# Patient Record
Sex: FEMALE | Race: BLACK OR AFRICAN AMERICAN | Employment: FULL TIME
[De-identification: names, ages, dates, MRNs, and addresses within clinical notes are randomized per-mention and may not be internally consistent; named-entity substitution may affect disease eponyms.]

---

## 2019-04-19 ENCOUNTER — HEALTH MAINTENANCE LETTER (OUTPATIENT)
Age: 24
End: 2019-04-19

## 2019-05-21 ENCOUNTER — MEDICAL CORRESPONDENCE (OUTPATIENT)
Dept: HEALTH INFORMATION MANAGEMENT | Facility: CLINIC | Age: 24
End: 2019-05-21

## 2019-06-20 ENCOUNTER — MEDICAL CORRESPONDENCE (OUTPATIENT)
Dept: HEALTH INFORMATION MANAGEMENT | Facility: CLINIC | Age: 24
End: 2019-06-20

## 2019-07-24 ENCOUNTER — PRE VISIT (OUTPATIENT)
Dept: PLASTIC SURGERY | Facility: CLINIC | Age: 24
End: 2019-07-24

## 2019-07-24 NOTE — TELEPHONE ENCOUNTER
FUTURE VISIT INFORMATION      FUTURE VISIT INFORMATION:    Date: 9/17/19    Time: 2:00pm    Location: [facility]  REFERRAL INFORMATION:    Referring provider:  Self    Referring providers clinic:  N/A    Reason for visit/diagnosis  Top consult F[facility]    RECORDS REQUESTED FROM:       No records to collect

## 2019-09-17 ENCOUNTER — PATIENT OUTREACH (OUTPATIENT)
Dept: PLASTIC SURGERY | Facility: CLINIC | Age: 24
End: 2019-09-17

## 2019-09-17 ENCOUNTER — OFFICE VISIT (OUTPATIENT)
Dept: PLASTIC SURGERY | Facility: CLINIC | Age: 24
End: 2019-09-17
Payer: COMMERCIAL

## 2019-09-17 VITALS
OXYGEN SATURATION: 100 % | WEIGHT: 236.2 LBS | HEART RATE: 71 BPM | BODY MASS INDEX: 39.35 KG/M2 | HEIGHT: 65 IN | SYSTOLIC BLOOD PRESSURE: 121 MMHG | DIASTOLIC BLOOD PRESSURE: 69 MMHG

## 2019-09-17 DIAGNOSIS — F64.0 GENDER DYSPHORIA IN ADULT: Primary | ICD-10-CM

## 2019-09-17 DIAGNOSIS — Z12.31 VISIT FOR SCREENING MAMMOGRAM: ICD-10-CM

## 2019-09-17 RX ORDER — CONTAINER,EMPTY
EACH MISCELLANEOUS
COMMUNITY
Start: 2019-05-10

## 2019-09-17 RX ORDER — MULTIVITAMIN WITH FOLIC ACID 400 MCG
1 TABLET ORAL
COMMUNITY

## 2019-09-17 RX ORDER — TESTOSTERONE CYPIONATE 200 MG/ML
VIAL (ML) INTRAMUSCULAR
COMMUNITY
Start: 2019-05-10 | End: 2020-09-01

## 2019-09-17 RX ORDER — TESTOSTERONE CYPIONATE 200 MG/ML
30 INJECTION, SOLUTION INTRAMUSCULAR
COMMUNITY
Start: 2019-07-16

## 2019-09-17 RX ORDER — SYRINGE W-NEEDLE,DISPOSAB,3 ML 25GX5/8"
SYRINGE, EMPTY DISPOSABLE MISCELLANEOUS
COMMUNITY
Start: 2019-05-10

## 2019-09-17 ASSESSMENT — PAIN SCALES - GENERAL: PAINLEVEL: NO PAIN (0)

## 2019-09-17 ASSESSMENT — MIFFLIN-ST. JEOR: SCORE: 1827.28

## 2019-09-17 NOTE — PROGRESS NOTES
[facility]:  Care Coordination Note     SITUATION   Patient (Roland, They/Them) is a 24 year old who is receiving support for:  Clinic Care Coordination - Initial (Top consultation - Alma Rosa)  .    BACKGROUND     Pt attended \[facility] [place] [facility]\"" consultation with Dr. St, pt was unaccompanied.     ASSESSMENT     Surgery              CGC Assessment  [facility] Care ([facility]) Enrollment: Enrolled  Patient has a therapist: Yes  Letter of support #1: Requested  Surgery being considered: Yes  Mastectomy: Yes          PLAN          Nursing Interventions:   [facility] program and services discussed with patient. Educational surgical packet provided and reviewed with patient. Process for accessing surgery discussed, including: WPATH standards of care, letters of support, treatment plan action steps, PA insurance process, surgery scheduling, and approximate timeline.     Photography consent signed by patient. Surgeon s photography outcomes reviewed with patient. Pt questions answered within scope of practice.     Follow-up plan:  Pt could not remember therapist last name, so ALBERTO was given to pt to return when they obtain their letter of support. Per Dr. St's note, pt needs to obtain mammogram. Pt is going to contemplate weight loss.        Mauro Adams

## 2019-09-17 NOTE — NURSING NOTE
"Chief Complaint   Patient presents with     Consult     new pt here for mastectomy consult       Vitals:    09/17/19 1354   BP: 121/69   BP Location: Left arm   Patient Position: Chair   Cuff Size: Adult Large   Pulse: 71   SpO2: 100%   Weight: 107.1 kg (236 lb 3.2 oz)   Height: 1.651 m (5' 5\")       Body mass index is 39.31 kg/m .    Rommel Milligan EMT    "

## 2019-09-17 NOTE — PROGRESS NOTES
"CC: Gender dysphoria, requesting top surgery.    HPI: Patient is a 24-year-old transmasculine individual who prefers they them pronouns.  They are interested in undergoing top surgery.  They have been seriously considering this and has been researching the topic for the past year.  They have history of binding their chest daily for the past 3 months.  By undergoing top surgery, they would like to better align their physical body with her chosen gender identity.  They transitioned to 4 months ago.  Has been on hormone testosterone injection therapy for the past 4 months.  They deny any previous breast history.    MEDS:   Prior to Admission medications    Medication Sig Start Date End Date Taking? Authorizing Provider   Multiple Vitamin (TAB-A-JASKARAN) TABS Take 1 tablet by mouth   Yes Reported, Patient   Needle, Disp, 18G X 1-1/2\" MISC Use for weekly administration of medication 5/10/19  Yes Reported, Patient   Sharps Container MISC For sharps 5/10/19  Yes Reported, Patient   Syringe 25G X 1\" 3 ML MISC Use for weekly administration of medication 5/10/19  Yes Reported, Patient   syringe, disposable, 1 ML MISC Use for weekly administration of medication 5/10/19  Yes Reported, Patient   testosterone cypionate (DEPOTESTOSTERONE) 200 MG/ML injection Inject 30 mg into the muscle 7/16/19  Yes Reported, Patient   Testosterone Cypionate 200 MG/ML SOLN  5/10/19  Yes Reported, Patient       ALLERGIES:   Allergies   Allergen Reactions     Cats Cough, Difficulty breathing, Hives, Itching and Shortness Of Breath     No Clinical Screening - See Comments      Seasonal, Strawberries, Cats      Strawberry Hives, Cough and Itching       PMH: None.    PSH: None.    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    FH: None.    ROS: Denies chest pain, shortness of breath, diabetes, MI, CVA, DVT, " "PE, and bleeding disorders.    PHYSICAL EXAMINATION: /69 (BP Location: Left arm, Patient Position: Chair, Cuff Size: Adult Large)   Pulse 71   Ht 1.651 m (5' 5\")   Wt 107.1 kg (236 lb 3.2 oz)   SpO2 100%   BMI 39.31 kg/m    General: No acute distress.  Chest examination was performed in the presence of chaperone.  This revealed bilateral grade 3 ptosis.  Breasts are pendulous.  Pectoralis muscles intact bilaterally.  There is asymmetry with underlying skeletal asymmetry's there quite frankly visible.  The left chest is wider than the right.  There is curvature of the spine.  Notch to nipple distance is 30 cm on the right and 31 cm on the left.  Areole or diameter is 6 cm bilaterally.  Nipple to fold distance is 12 cm on the right and 13 cm on the left.  Base diameter is 14 cm on the right and 50 cm on the left.  There are moderate lateral thoracic rolls bilaterally.    ASSESSMENT: Gender dysphoria, requesting top surgery.    PLAN: I explained the need for letter of support from a mental health professional.  I am also requesting a baseline screening mammogram given the level of ptosis.  I recommended weight loss prior to top surgery to maximize the aesthetic outcome.  Patient would like to consider this.  There is a chance that they would like surgery without weight loss.  With these items obtained, patient is a potential candidate for bilateral simple complete mastectomy with free nipple graft reconstruction as a form of top surgery to masculinize the chest.  I explained this outpatient procedure in detail today.  I explained the risks to include bleeding, infection, injury to surrounding structures, fluid collection, nipple or nipple graft loss, nipple sensory loss, change in nipple size, wound healing difficulties, contour deformity, dog ears, asymmetry, and need for revision surgery.  Patient accepts these risks and wishes to proceed with surgery.  We will wait for a letter of support and the " patient's decision on weight loss.    Total time spent with patient was 30 min of which greater than 50% was in counseling.

## 2019-09-17 NOTE — LETTER
"9/17/2019       RE: Roland Frances  [address]  [place] [address]     Dear Colleague,    Thank you for referring your patient, Roland Frances, to the [facility] PLASTIC AND RECONSTRUCTIVE SURGERY at [facility]. Please see a copy of my visit note below.    CC: Gender dysphoria, requesting top surgery.    HPI: Patient is a 24-year-old transmasculine individual who prefers they them pronouns.  They are interested in undergoing top surgery.  They have been seriously considering this and has been researching the topic for the past year.  They have history of binding their chest daily for the past 3 months.  By undergoing top surgery, they would like to better align their physical body with her chosen gender identity.  They transitioned to 4 months ago.  Has been on hormone testosterone injection therapy for the past 4 months.  They deny any previous breast history.    MEDS:   Prior to Admission medications    Medication Sig Start Date End Date Taking? Authorizing Provider   Multiple Vitamin (TAB-A-JASKARAN) TABS Take 1 tablet by mouth   Yes Reported, Patient   Needle, Disp, 18G X 1-1/2\" MISC Use for weekly administration of medication 5/10/19  Yes Reported, Patient   Sharps Container MISC For sharps 5/10/19  Yes Reported, Patient   Syringe 25G X 1\" 3 ML MISC Use for weekly administration of medication 5/10/19  Yes Reported, Patient   syringe, disposable, 1 ML MISC Use for weekly administration of medication 5/10/19  Yes Reported, Patient   testosterone cypionate (DEPOTESTOSTERONE) 200 MG/ML injection Inject 30 mg into the muscle 7/16/19  Yes Reported, Patient   Testosterone Cypionate 200 MG/ML SOLN  5/10/19  Yes Reported, Patient       ALLERGIES:   Allergies   Allergen Reactions     Cats Cough, Difficulty breathing, Hives, Itching and Shortness Of Breath     No Clinical Screening - See Comments      Seasonal, Strawberries, Cats      Strawberry Hives, Cough and Itching " "      PMH: None.    PSH: None.    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    FH: None.    ROS: Denies chest pain, shortness of breath, diabetes, MI, CVA, DVT, PE, and bleeding disorders.    PHYSICAL EXAMINATION: /69 (BP Location: Left arm, Patient Position: Chair, Cuff Size: Adult Large)   Pulse 71   Ht 1.651 m (5' 5\")   Wt 107.1 kg (236 lb 3.2 oz)   SpO2 100%   BMI 39.31 kg/m     General: No acute distress.  Chest examination was performed in the presence of chaperone.  This revealed bilateral grade 3 ptosis.  Breasts are pendulous.  Pectoralis muscles intact bilaterally.  There is asymmetry with underlying skeletal asymmetry's there quite frankly visible.  The left chest is wider than the right.  There is curvature of the spine.  Notch to nipple distance is 30 cm on the right and 31 cm on the left.  Areole or diameter is 6 cm bilaterally.  Nipple to fold distance is 12 cm on the right and 13 cm on the left.  Base diameter is 14 cm on the right and 50 cm on the left.  There are moderate lateral thoracic rolls bilaterally.    ASSESSMENT: Gender dysphoria, requesting top surgery.    PLAN: I explained the need for letter of support from a mental health professional.  I am also requesting a baseline screening mammogram given the level of ptosis.  I recommended weight loss prior to top surgery to maximize the aesthetic outcome.  Patient would like to consider this.  There is a chance that they would like surgery without weight loss.  With these items obtained, patient is a potential candidate for bilateral simple complete mastectomy with free nipple graft reconstruction as a form of top surgery to masculinize the chest.  I explained this outpatient procedure in detail today.  I explained the risks to include bleeding, infection, injury to surrounding " structures, fluid collection, nipple or nipple graft loss, nipple sensory loss, change in nipple size, wound healing difficulties, contour deformity, dog ears, asymmetry, and need for revision surgery.  Patient accepts these risks and wishes to proceed with surgery.  We will wait for a letter of support and the patient's decision on weight loss.    Total time spent with patient was 30 min of which greater than 50% was in counseling.    Again, thank you for allowing me to participate in the care of your patient.      Sincerely,    Jm St MD

## 2019-10-11 ENCOUNTER — APPOINTMENT (OUTPATIENT)
Dept: FAMILY MEDICINE | Age: 24
End: 2019-10-11

## 2019-10-11 ENCOUNTER — PATIENT OUTREACH (OUTPATIENT)
Dept: PLASTIC SURGERY | Facility: CLINIC | Age: 24
End: 2019-10-11

## 2019-10-11 NOTE — PROGRESS NOTES
[facility]:  Care Coordination Note     SITUATION   Patient (Roland, They/Them)  is a 24 year old who is receiving support for:  Clinic Care Coordination - Follow-up (Letter of support received)  .    BACKGROUND     Pt submitted new letter of support, which is adequate for PA.    Pt needs to decide on weight loss & Complete mammogram.     ASSESSMENT     Surgery              CGC Assessment  Comprehensive Gender Care ([facility] Enrollment: Enrolled  Patient has a therapist: Yes  Name of therapist: Edith Mejía  Letter of support #1: Received  Letter #1 Date: 10/11/19  Surgery being considered: Yes  Mastectomy: Yes          PLAN          Nursing Interventions:   Reviewed letter of support for WPATH requirements, which is adequate.    Follow-up plan:  Wait to hear from pt on weight loss and mammogram completion.        Mauro Adams

## 2019-11-12 ENCOUNTER — TRANSFERRED RECORDS (OUTPATIENT)
Dept: HEALTH INFORMATION MANAGEMENT | Facility: CLINIC | Age: 24
End: 2019-11-12

## 2019-11-15 ENCOUNTER — TELEPHONE (OUTPATIENT)
Dept: PLASTIC SURGERY | Facility: CLINIC | Age: 24
End: 2019-11-15

## 2019-11-15 ENCOUNTER — PATIENT OUTREACH (OUTPATIENT)
Dept: PLASTIC SURGERY | Facility: CLINIC | Age: 24
End: 2019-11-15

## 2019-11-15 NOTE — PROGRESS NOTES
[facility]:  Care Coordination Note     SITUATION   Patient (Roland, Maye/Them)  is a 24 year old who is receiving support for:  Clinic Care Coordination - Follow-up (Received mammogram results)  .    BACKGROUND     Pt completed top consult with Dr. St on 9/17/19. Pt submitted mammogram results and letter of support.     Ready to PA.     ASSESSMENT     Surgery              CGC Assessment  Comprehensive Gender Care ([facility]) Enrollment: Enrolled  Patient has a therapist: Yes  Name of therapist: Edith Mejía  Letter of support #1: Received  Letter #1 Date: 10/11/19  Surgery being considered: Yes  Mastectomy: Yes  Mammogram completed: Yes(11/14/19 @ [facility] - negative)          PLAN          Nursing Interventions:   Reviewed letter of support for WPATH standards of care which is adequate.     Follow-up plan:  DIMITRI MartinezCC to review mammogram from [facility].  Ready to HARSH Adams

## 2019-12-02 ENCOUNTER — TELEPHONE (OUTPATIENT)
Dept: PLASTIC SURGERY | Facility: CLINIC | Age: 24
End: 2019-12-02

## 2019-12-02 NOTE — TELEPHONE ENCOUNTER
received [facility] denial #[id number] for  codes [id number] and [id number], as this is not a covered item under members plan benefits

## 2019-12-03 ENCOUNTER — PATIENT OUTREACH (OUTPATIENT)
Dept: PLASTIC SURGERY | Facility: CLINIC | Age: 24
End: 2019-12-03

## 2019-12-04 NOTE — PROGRESS NOTES
Mailed pt ALBERTO for Jorge [facility] Villa, to help appeal PA denial.     Mauro Adams, [facility]  Transgender Care Coordinator

## 2020-03-11 ENCOUNTER — HEALTH MAINTENANCE LETTER (OUTPATIENT)
Age: 25
End: 2020-03-11

## 2020-05-27 NOTE — TELEPHONE ENCOUNTER
FUTURE VISIT INFORMATION      FUTURE VISIT INFORMATION:    Date: 9/1/20    Time: 3:00pm    Location: [facility]  REFERRAL INFORMATION:    Referring provider:  self    Referring providers clinic:  N/A    Reason for visit/diagnosis  top consult    RECORDS REQUESTED FROM:       No recs to collect

## 2020-08-01 ENCOUNTER — PRE VISIT (OUTPATIENT)
Dept: SURGERY | Facility: CLINIC | Age: 25
End: 2020-08-01

## 2020-09-01 ENCOUNTER — PATIENT OUTREACH (OUTPATIENT)
Dept: PLASTIC SURGERY | Facility: CLINIC | Age: 25
End: 2020-09-01

## 2020-09-01 ENCOUNTER — OFFICE VISIT (OUTPATIENT)
Dept: PLASTIC SURGERY | Facility: CLINIC | Age: 25
End: 2020-09-01

## 2020-09-01 VITALS — WEIGHT: 230 LBS | HEIGHT: 65 IN | BODY MASS INDEX: 38.32 KG/M2

## 2020-09-01 DIAGNOSIS — F64.0 GENDER DYSPHORIA IN ADULT: Primary | ICD-10-CM

## 2020-09-01 ASSESSMENT — MIFFLIN-ST. JEOR: SCORE: 1794.15

## 2020-09-01 ASSESSMENT — PAIN SCALES - GENERAL: PAINLEVEL: NO PAIN (0)

## 2020-09-01 NOTE — PROGRESS NOTES
[facility]:  Care Coordination Note     SITUATION   Patient (Roland, they/them) is a 25 year old who is receiving support for:  Clinic Care Coordination - Follow-up  .    BACKGROUND     Ready to PA for top surgery with Dr. Rodriguez.    Pt had denial of top surgery with Dr. St from [facility]. It appears pt has new insurance.     ASSESSMENT     Surgery              CGC Assessment  Comprehensive Gender Care (CGC) Enrollment: Enrolled  Patient has a therapist: Yes  Name of therapist: Edith Chin  Letter of support #1: Received(no date on letter, uploaded on 10/11/2019)  Surgery being considered: Yes  Mastectomy: Yes  Mammogram completed: Yes(11/2019, per Dr. Rodriguez no additional imaging needed.)          PLAN          Nursing Interventions:   Reviewed letter of support for WPATH standards of care which is adequate.     Follow-up plan:  Ready to PA for top surgery.        Mauro Adams

## 2020-09-01 NOTE — NURSING NOTE
"Chief Complaint   Patient presents with     Consult     Pt here for top consult       Vitals:    09/01/20 1448   Weight: 104.3 kg (230 lb)   Height: 1.651 m (5' 5\")       Body mass index is 38.27 kg/m .      YONAS QuigleyT                    No vitals taken per provider  "

## 2020-09-01 NOTE — LETTER
9/1/2020       RE: Roland Frances  [address]  [place] [address]     Dear Colleague,    Thank you for referring your patient, Roland Frances, to the [facility] PLASTIC AND RECONSTRUCTIVE SURGERY at [facility] Please see a copy of my visit note below.    PLASTICS NEW TOP   HPI: This is a 25 year old biological female who identifies as trans-masculine with a history of gender dysphoria who presents today by themself for a consultation for top surgery. Their pronouns are they/them and their preferred name is Roland. They were referred by the trans community and found me online. They made their appointment 3 months ago. Their previous therapist was Edith Gould at [facility] but they no longer see her because they do not accept their insurance anymore. She wrote a letter of support for Roland about a year ago. Roland will contact Mauro to find out if they need a new letter of support. They have been on testosterone for 15 months, administered by Dr. Barrientos at [facility]. They have been living their chosen gender identity/role for about a year and a half. They bind with GC2B. No breast lumps, skin puckering, nipple drainage, or other breast problems. They had a mammogram in November of 2019 when they were expecting to have top surgery done with Dr. St. They saw. Dr. St last year, who suggested they lose some weight before surgery. Roland did not feel like he was the right match.     Medical Hx: Gender dysphoria. No history of asthma, diabetes mellitus, or GERD. No bleeding, clotting, healing or scarring problems. Mild obesity.   Nasal polyp.    Surgical Hx: Nasal polyp extraction- June 2020    Family Hx: No family history of ovarian cancer.  Maternal aunt had breast and colon cancer.  Maternal grandfather had diabetes.   Mother has hypertension.    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  "Relationship status/family: No siblings. Lives with partner, Huy, and a dog. His partner is a student. They are able to take care of Jabri post-op. Smoking status: Non-smoker. Alcohol use: Very rarely. Diet: Cooks a lot, good balance of meat and veggies. Caffeine: 1 cup of coffee in the morning. No soda. Exercise: Goes for walks. Work out videos (boxing). Sleep: Gets about 8 hours Qnight.     PE: General: Height: 5' 5\" Weight: 230 lbs 0 oz (stated)  Chest: Nice upper chest contour, mild manubrial rounding. Very well developed trapezius muscles. Mild acne. Grade 3 nipple ptosis. Bilateral striae on breasts. Left breast is slightly lower, but right breast is slightly more full. Both breasts are at least 400-500g. Breasts situated fairly close together in midline. Minimal lateral thoracic fullness. Some lateral chest wall \"tapering\".  IMFs about 2-3cm low to pec origin. Left IMF about 0.5cm lower than the left. Mainly fibrofatty tissues on palpation.  Slight lumpiness in inferior poles of both breasts.  Minimal anterior axillary folds.  No lymphadenopathy or masses.   Photos taken with consent.     A&P: 25 year old biological female who identifies as trans-masculine who is a good candidate for gender affirming top surgery with one of the following: bilateral simple mastectomy vs. breast reduction, with nipple graft reconstruction. They will most likely need a bilateral simple mastectomy depending on intraoperative findings and the patient's desired outcome. They are interested in nipple grafts.The patient will not need a pre-op mammogram, as they have had already one in Nov. 2019. But they will require an H&P with their PCP.    They will be in contact with our Transgender Coordinator to discuss communications with staff and timeline. Any further discussion of risks and complications will be reviewed during the pre-op visit.     We will initiate the prior authorization process once we determine if insurance will require " a new letter of support.    According to [place] Case Law and [facility] standards of care, with an appropriate letter of support from a mental health provider, top surgery/mastectomy is medically necessary for the treatment of gender dysphoria.     Total time = 20-30 minutes, over half of which spent discussing surgical options    This note was prepared on behalf of Nehal Rodriguez MD by More Pagan, a trained medical scribe, based on my observations and the provider's statements to me.       Again, thank you for allowing me to participate in the care of your patient.      Sincerely,    Nehal Rodriguez MD

## 2020-09-01 NOTE — LETTER
9/1/2020       RE: Roland Frances  [address]  [place] [address]     Dear Colleague,    Thank you for referring your patient, Roland Frances, to the [facility] PLASTIC AND RECONSTRUCTIVE SURGERY at [facility]. Please see a copy of my visit note below.    PLASTICS NEW TOP   HPI: This is a 25 year old biological female who identifies as trans-masculine with a history of gender dysphoria who presents today by themself for a consultation for top surgery. Their pronouns are they/them and their preferred name is Roland. They were referred by the trans community and found me online. They made their appointment 3 months ago. Their previous therapist was Edith Gould at [facility] but they no longer see her because they do not accept their insurance anymore. She wrote a letter of support for Roland about a year ago. Roland will contact Mauro to find out if they need a new letter of support. They have been on testosterone for 15 months, administered by Dr. Barrientos at [facility]. They have been living their chosen gender identity/role for about a year and a half. They bind with GC2B. No breast lumps, skin puckering, nipple drainage, or other breast problems. They had a mammogram in November of 2019 when they were expecting to have top surgery done with Dr. St. They saw. Dr. St last year, who suggested they lose some weight before surgery. Roland did not feel like he was the right match.     Medical Hx: Gender dysphoria. No history of asthma, diabetes mellitus, or GERD. No bleeding, clotting, healing or scarring problems. Mild obesity.   Nasal polyp.    Surgical Hx: Nasal polyp extraction- June 2020    Family Hx: No family history of ovarian cancer.  Maternal aunt had breast and colon cancer.  Maternal grandfather had diabetes.   Mother has hypertension.          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  "Relationship status/family: No siblings. Lives with partner, Huy, and a dog. His partner is a student. They are able to take care of Jabri post-op. Smoking status: Non-smoker. Alcohol use: Very rarely. Diet: Cooks a lot, good balance of meat and veggies. Caffeine: 1 cup of coffee in the morning. No soda. Exercise: Goes for walks. Work out videos (boxing). Sleep: Gets about 8 hours Qnight.     PE: General: Height: 5' 5\" Weight: 230 lbs 0 oz (stated)  Chest: Nice upper chest contour, mild manubrial rounding. Very well developed trapezius muscles. Mild acne. Grade 3 nipple ptosis. Bilateral striae on breasts. Left breast is slightly lower, but right breast is slightly more full. Both breasts are at least 400-500g. Breasts situated fairly close together in midline. Minimal lateral thoracic fullness. Some lateral chest wall \"tapering\".  IMFs about 2-3cm low to pec origin. Left IMF about 0.5cm lower than the left. Mainly fibrofatty tissues on palpation.  Slight lumpiness in inferior poles of both breasts.  Minimal anterior axillary folds.  No lymphadenopathy or masses.   Photos taken with consent.     A&P: 25 year old biological female who identifies as trans-masculine who is a good candidate for gender affirming top surgery with one of the following: bilateral simple mastectomy vs. breast reduction, with nipple graft reconstruction. They will most likely need a bilateral simple mastectomy depending on intraoperative findings and the patient's desired outcome. They are interested in nipple grafts.The patient will not need a pre-op mammogram, as they have had already one in Nov. 2019. But they will require an H&P with their PCP.    They will be in contact with our Transgender Coordinator to discuss communications with staff and timeline. Any further discussion of risks and complications will be reviewed during the pre-op visit.     We will initiate the prior authorization process once we determine if insurance will require " a new letter of support.    According to [place] Case Law and [facility] standards of care, with an appropriate letter of support from a mental health provider, top surgery/mastectomy is medically necessary for the treatment of gender dysphoria.     Total time = 20-30 minutes, over half of which spent discussing surgical options    This note was prepared on behalf of Nehal Rodriguez MD by More Pagan, a trained medical scribe, based on my observations and the provider's statements to me.     Again, thank you for allowing me to participate in the care of your patient.  Sincerely,    Nehal Rodriguez MD

## 2020-09-08 ENCOUNTER — PATIENT OUTREACH (OUTPATIENT)
Dept: PLASTIC SURGERY | Facility: CLINIC | Age: 25
End: 2020-09-08

## 2020-10-15 ENCOUNTER — TELEPHONE (OUTPATIENT)
Dept: PLASTIC SURGERY | Facility: CLINIC | Age: 25
End: 2020-10-15

## 2020-10-16 ENCOUNTER — TELEPHONE (OUTPATIENT)
Dept: PLASTIC SURGERY | Facility: CLINIC | Age: 25
End: 2020-10-16

## 2020-10-16 NOTE — TELEPHONE ENCOUNTER
received VM from nurse with patient's insurance-she is working on an auth for same procedure for bilataral mastectomy for a a CA -i withdrew my request

## 2020-10-26 ENCOUNTER — TELEPHONE (OUTPATIENT)
Dept: PLASTIC SURGERY | Facility: CLINIC | Age: 25
End: 2020-10-26

## 2020-10-26 NOTE — TELEPHONE ENCOUNTER
tried to request another PA with insurance, found out that dr Rodriguez is out of network with patients plan,facility is in network

## 2020-10-28 ENCOUNTER — TELEPHONE (OUTPATIENT)
Dept: PLASTIC SURGERY | Facility: CLINIC | Age: 25
End: 2020-10-28

## 2020-10-29 ENCOUNTER — TELEPHONE (OUTPATIENT)
Dept: PLASTIC SURGERY | Facility: CLINIC | Age: 25
End: 2020-10-29

## 2020-10-30 ENCOUNTER — TELEPHONE (OUTPATIENT)
Dept: SURGERY | Facility: CLINIC | Age: 25
End: 2020-10-30

## 2020-11-04 ENCOUNTER — TELEPHONE (OUTPATIENT)
Dept: SURGERY | Facility: CLINIC | Age: 25
End: 2020-11-04

## 2020-11-23 ENCOUNTER — TELEPHONE (OUTPATIENT)
Dept: PLASTIC SURGERY | Facility: CLINIC | Age: 25
End: 2020-11-23

## 2020-11-23 NOTE — TELEPHONE ENCOUNTER
[facility] Call Center    Phone Message    May a detailed message be left on voicemail: yes     Reason for Call: Patient submitted an online appointment request to see Dr. St for Phalloplasty Consult.  Please contact patient with appointment options    Action Taken: Message routed to:  [facility] Surg    Travel Screening: Not Applicable

## 2020-11-24 DIAGNOSIS — F64.0 GENDER DYSPHORIA IN ADOLESCENT AND ADULT: Primary | ICD-10-CM

## 2020-11-24 RX ORDER — CEFAZOLIN SODIUM 2 G/50ML
2 SOLUTION INTRAVENOUS
Status: CANCELLED | OUTPATIENT
Start: 2020-11-24

## 2020-11-24 RX ORDER — CEFAZOLIN SODIUM 1 G/50ML
1 INJECTION, SOLUTION INTRAVENOUS SEE ADMIN INSTRUCTIONS
Status: CANCELLED | OUTPATIENT
Start: 2020-11-24

## 2020-11-24 NOTE — TELEPHONE ENCOUNTER
Called pt discussed obtaining the 2 LOS needed for bottom surgery then we will schedule for consult.     Mauro Adams [facility]  Transgender Care Coordinator

## 2021-01-03 ENCOUNTER — HEALTH MAINTENANCE LETTER (OUTPATIENT)
Age: 26
End: 2021-01-03

## 2021-01-25 DIAGNOSIS — Z11.59 ENCOUNTER FOR SCREENING FOR OTHER VIRAL DISEASES: ICD-10-CM

## 2021-02-08 ENCOUNTER — TELEPHONE (OUTPATIENT)
Dept: PLASTIC SURGERY | Facility: CLINIC | Age: 26
End: 2021-02-08

## 2021-02-08 DIAGNOSIS — F64.0 GENDER DYSPHORIA IN ADOLESCENT AND ADULT: Primary | ICD-10-CM

## 2021-02-08 NOTE — TELEPHONE ENCOUNTER
[facility] :  Care Coordination Note     SITUATION   Roland is a 26 year old adult who is receiving support for:  Care Team  .    BACKGROUND     Pt sent in 2 LOS for phalloplasty. Scheduled consult with Dr. St for 5/25/21.     ASSESSMENT     Surgery              [facility] Assessment  [facility] ([facility]) Enrollment: Enrolled  Patient has a therapist: Yes  Name of therapist: Edith Chin  Letter of support #1: Received  Letter of support #2: Received  Surgery being considered: Yes  Phalloplasty: Yes          PLAN          Nursing Interventions:  [facility] assessment completed    Follow-up plan:           Oksana Sweeney

## 2021-02-09 ENCOUNTER — PRE VISIT (OUTPATIENT)
Dept: PLASTIC SURGERY | Facility: CLINIC | Age: 26
End: 2021-02-09

## 2021-02-09 ENCOUNTER — OFFICE VISIT (OUTPATIENT)
Dept: PLASTIC SURGERY | Facility: CLINIC | Age: 26
End: 2021-02-09
Payer: COMMERCIAL

## 2021-02-09 VITALS
OXYGEN SATURATION: 99 % | WEIGHT: 220 LBS | SYSTOLIC BLOOD PRESSURE: 133 MMHG | BODY MASS INDEX: 36.65 KG/M2 | HEIGHT: 65 IN | HEART RATE: 49 BPM | DIASTOLIC BLOOD PRESSURE: 71 MMHG | TEMPERATURE: 98.3 F

## 2021-02-09 DIAGNOSIS — F64.0 GENDER DYSPHORIA IN ADOLESCENT AND ADULT: Primary | ICD-10-CM

## 2021-02-09 PROCEDURE — 99212 OFFICE O/P EST SF 10 MIN: CPT | Performed by: SURGERY

## 2021-02-09 ASSESSMENT — MIFFLIN-ST. JEOR: SCORE: 1743.79

## 2021-02-09 ASSESSMENT — PAIN SCALES - GENERAL: PAINLEVEL: NO PAIN (0)

## 2021-02-09 NOTE — PROGRESS NOTES
PLASTICS PRE-OP  This is a 26 year old year old biological female who identifies as trans-masculine who presents for their pre-op visit prior to bilateral simple mastectomy with nipple graft reconstruction scheduled for 2/17/21. They are here today by themselves. The patient has had a negative mammogram (Novermber 2019), and their letter of support from Migue Oakley at [facility] has been received. History and physical were received (1/26/21). COVID test is scheduled for Monday. Their partner Blanca will be taking care of them post-op.    My LPN, Tory , discussed periop instructions with the patient including: not eating anything 8 hours prior to surgery, drinking clear liquids up to 2 hours before surgery except for morning medications with a sip of water, the preop shower with surgical soap which was given, and wearing a button- or zip-up shirt on the day of surgery. She also instructed the patient to avoid NSAIDs x 1 week both before AND after surgery, but they may take Tylenol post-op for pain as needed. She also gave the patient a folder with information on eyal-op topics, including where the surgery will be.     I discussed the following with the patient; preop, intraop and postop phases of care on the day of surgery, the placement of a bladder catheter during surgery that will likely be removed in recovery, postop cares and limitations with relation to home and work settings, 5-lb weight restriction for the first 3 weeks postop, and how long to maintain limited activities. We also discussed Zofran, oxycodone, Z-lucho, and antipruritics which will be prescribed. We discussed that preventing constipation will be their responsibility, and we discussed methods such as aloe, prune juice or Miralax.     In addition, we went over the possible risks and complications involved with this elective procedure. These include but are not limited to: infection, bleeding, hematoma/seroma formation, and poor healing  (including dehiscence, nipple graft loss, or hypertrophic scarring). We also discussed the possibility of altered chest sensation (either hypo or hypersensitive), residual deformities and asymmetries, possible further surgical revisions, and possible injury to surrounding neurovascular and musculoskeletal structures, including intra-axillary or intra-thoracic. We lastly discussed anesthetic risks including DVT/PE or cardiopulmonary events.     Total time = 10 minutes, spent on the date of encounter doing chart review, history and physical, dressing changes, documentation, patient education, and any further activity as noted above.     This note was prepared on behalf of Nehal Rodriguez MD by More Pagan, a trained medical scribe, based on my observations and the provider's statements to me.

## 2021-02-09 NOTE — LETTER
2/9/2021       RE: Roland Frances  [address]  [place] [address]     Dear Colleague,    Thank you for referring your patient, Roland Frances, to the [facility] [place] at [facility]. Please see a copy of my visit note below.    PLASTICS PRE-OP  This is a 26 year old year old biological female who identifies as trans-masculine who presents for their pre-op visit prior to bilateral simple mastectomy with nipple graft reconstruction scheduled for 2/17/21. They are here today by themselves. The patient has had a negative mammogram (Novermber 2019), and their letter of support from Migue Oakley at [facility] has been received. History and physical were received (1/26/21). COVID test is scheduled for Monday. Their partner Blanca will be taking care of them post-op.    My LPN, Tory , discussed periop instructions with the patient including: not eating anything 8 hours prior to surgery, drinking clear liquids up to 2 hours before surgery except for morning medications with a sip of water, the preop shower with surgical soap which was given, and wearing a button- or zip-up shirt on the day of surgery. She also instructed the patient to avoid NSAIDs x 1 week both before AND after surgery, but they may take Tylenol post-op for pain as needed. She also gave the patient a folder with information on eyal-op topics, including where the surgery will be.     I discussed the following with the patient; preop, intraop and postop phases of care on the day of surgery, the placement of a bladder catheter during surgery that will likely be removed in recovery, postop cares and limitations with relation to home and work settings, 5-lb weight restriction for the first 3 weeks postop, and how long to maintain limited activities. We also discussed Zofran, oxycodone, Z-lucho, and antipruritics which will be prescribed. We  discussed that preventing constipation will be their responsibility, and we discussed methods such as aloe, prune juice or Miralax.     In addition, we went over the possible risks and complications involved with this elective procedure. These include but are not limited to: infection, bleeding, hematoma/seroma formation, and poor healing (including dehiscence, nipple graft loss, or hypertrophic scarring). We also discussed the possibility of altered chest sensation (either hypo or hypersensitive), residual deformities and asymmetries, possible further surgical revisions, and possible injury to surrounding neurovascular and musculoskeletal structures, including intra-axillary or intra-thoracic. We lastly discussed anesthetic risks including DVT/PE or cardiopulmonary events.     Total time = 10 minutes, spent on the date of encounter doing chart review, history and physical, dressing changes, documentation, patient education, and any further activity as noted above.     This note was prepared on behalf of Nehal Rodriguez MD by More Pagan, a trained medical scribe, based on my observations and the provider's statements to me.       Again, thank you for allowing me to participate in the care of your patient.      Sincerely,    Nehal Rodriguez MD

## 2021-02-09 NOTE — PATIENT INSTRUCTIONS
Bilateral Mastectomy   Pre and Post op Surgery Instructions    You are scheduled for Bilateral Mastectomy with nipple grafts on 2/17/21 at 1:00 PM    You will need to arrive at 11:00 [place] [address]. You can park in the Green Lot and check in on 3rd floor. (See attached map).     - Please make sure you have someone to drive you home after your surgery and you will need someone to stay with you at home 24 hours after your surgery.    The surgery will be about 3-4 hours long. You will be in recovery afterwards for about 1-2 hours.     Before Surgery:  - 8 hours prior to surgery stop eating solid food. You can continue to drink clear liquids (water, apple juice, Gatorade) up to 2 hours before surgery. If you have any medications that need to be taken in this timeframe, you can take them with a small sip of water    - No Ibuprofen, Advil, Aleve, Naproxen, Motrin, Aspirin for 7 days prior to surgery. If you are having pain in the week before surgery Tylenol is okay to take.    - Wear or bring a button up or zip up shirt with you to the hospital.    - Wash with surgical soap:      Showering with an antiseptic soap prior to an invasive procedure will decrease the  bacteria that is normally found on the skin.     Using 1/2 of the bottle for each application.  Be sure to use the whole bottle before coming to surgery.    The evening before surgery, shower or bathe as you normally would, using your preferred soap.  Give special attention to areas where the incisions will be made. Rinse thoroughly.  You may wash your hair with your regular shampoo.     Next wash your entire body, from the chin down, with the special antiseptic soap (full strength) using a freshly laundered clean washcloth, again giving special attention to areas where incisions will be made.    Rinse thoroughly and dry off using a freshly laundered clean towel.     Wear clean  clothes/pajamas and sleep on clean sheets    Repeat steps 2 and 3 in the morning before coming to surgery (using the rest of the bottle of soap).     **If you have a reaction to the surgical soap, let the nurse know.     Events of day:     -Check in on the 3rd floor of the hospital for your surgery.    Pre-op     - After you check in, you will meet with a pre-op nurse. They will go over your medications, review your H&P (pre-op physical), have you change into a paper gown, and your chest will be wiped again with soap.    - They will place compression boots on both legs to help decrease risk of blood clots.     - You may be asked to complete a pregnancy test. If you have had no exposure to sperm, you can decline.    - You will meet with the anesthesiologists and nurse anesthetist who will be keeping you asleep during surgery, they will be placing an IV, and will be monitoring you throughout the surgery.    - You will meet with the RN as you are being moved into operating room, they will be helping to position you and keep you comfortable during the surgery.     - Dr. Rodriguez will meet you in the pre-op area to discuss any questions about surgery, make markings on your chest for planning, and to sign your consent.     Intra-op (OR)    - A catheter will be placed in your bladder after you are sleeping and is typically removed before you wake up. The longest this would typically be in is in the post-op recovery room if needed.     - There will be straps and pillows to help position you and keep you in place during the surgery.    - The tissue that is removed will be sent to pathology to be analyzed and then discarded.     - AYLIN drains will be placed (one in each armpit) and a pain catheter will be placed in the center of your chest.     - Closure is done with dissolvable sutures under the skin and is then sealed with glue, and tape.    Post-op/Recovery    - You can usually go home the same day so long as you are eating,  "drinking, voiding, and pain is well controlled.  You will be sent home with all needed supplies.     - Post-Op medications you will be given in addition to the anesthesia include: Zofran (nausea), Oxycodone (pain), Z-lucho (infection), and antipruritic (itching).     - You will leave the hospitals with an ace bandage wrapped around your chest for compression. You may keep the ace bandage on until you come back for your one week post op visit or you may adjust the wrap as needed if it is either too tight or too loose.     Post-Op Cares:     - No lifting over 5 lbs for 3 weeks after surgery    - No stretching arms out or above the head (\"modified T-cary\")    - Walk around frequently to help prevent blood clots    - Do deep breathing exercises to prevent pneumonia    - No sleeping on stomach x 3 weeks after surgery, if it is difficult not to roll over onto your stomach you can sleep in a recliner or use additional pillows    - Do not use any ice packs or heat packs    - Preventing constipation will be your responsibility. You can use whatever method works best for you such as; aloe, prune juice, Sennokot or Miralax.  - No showering in the first week after your surgery.     What to expect at your 1st post op visit:    When you come in for your 1st post op visit, we will assess the output of your drains and remove the pain catheter (On-Q) that was placed on your chest.     -Please remember to bring the documentations of your output with you to your appointment so we can review how much your drains have been putting out since your surgery.     -We will remove the bolsters that was placed on your nipples and teach you how to perform nipple graft dressings.     -You will be given supplies to take home and will need to continue wearing the ace wrap compression for another week after the drains are removed.       Nipple Care:   Change nipple dressings daily.  Take dressings off prior to showering and reapply after showering.  No " direct shower spray on nipple grafts for 4 weeks.     Cut vaseline gauze to nipple size and place over nipple.  Place folded white gauze over vaseline gauze and cover with plastic dressing.  Do dressing changes for 1 more week.  If you continue to have drainage, continue the dressings until drainage stops.       Drain Care:  You will be discharged with Adam-Burleson (AYLIN) drainage tubes.  These tubes drain fluid from your incision, helping to prevent swelling and reducing the risk for infection.  The tube is held in place by a few stitches. Pin your AYLIN drain to your clothing by using a safety pin through the plastic loop on the top of the bulb. If the drain is not attached to your clothing, it may pull out from under your skin. Drains are uncomfortable!    Emptying the drain bulb: The measuring cup provided by the hospital or a measuring cup that is used only for the AYLIN drain.  1. Wash your hands with soap and water.  2. Hold the bulb securely.  3. Remove the drainage plug from the emptying port.  4. Carefully turn the bulb upside down over the measuring cup, and gently squeeze all of the drainage into the measuring cup.  5. Squeeze the middle of the bulb firmly so that the bulb is as flat as possible.                                                                                                                                                    6. While still squeezing the bulb, replace the drainage plug. This step is important to keep the drain suction  7. Measure how much fluid you removed from the bulb.  8. Write down the amount and color of the fluid you removed from the bulb. If  you have more than one drain, keep a separate record for each one.  9. Empty the fluid into the toilet and flush.  10. Rinse the measuring cup, and wash your hands with soap and water.  11. You should empty the drain at least two times each day, in the morning and at bedtime.  12. Drainage tubes are removed when the output is less  than 20ml in a 24 hour period for 2 consecutive days.  13. Output will be somewhere between 30 to 50 mLs per day, but don't be alarmed if it is more or less. Output may not be equal between sides. Amounts will probably decrease over time.  14. The color coming from the drains may vary from deep red, light pink, or clear yellow.    Stripping the tube:  To prevent clots from blocking the drain, you will need to  strip  it. Stripping means that you use your fingers to squeeze along the length of the drain to help maintain the flow of drainage.    Wash your hands.    Using one hand, firmly hold the tubing near the insertion site (as close to your skin as the ace wrap and gauze dressing will allow). This will prevent the drain from being pulled out while you are stripping it and from pulling on skin and sutures.    [place] upper/top fingers and milk with the bottom or lower fingers.    Using your index finger and thumb of the other hand, squeeze the tubing below the  first hand. You should squeeze it firmly enough so the tubing becomes flat.     Maintain pressure on the tube, as you are squeezing, slide your index finger and thumb down the tube about 4-6 inches toward the bulb. (use alcohol wipes or lotion to help).    Then, release the hand closest to where the tube is attached to the body (making sure to keep pressure with the other hand), and move upper/anchor hand down. Squeeze, Repeat in segments.    Do not release the pressure you are creating in the tubing until you reach the bulb.  The whole tube will be flat.     If at any time it feels like the tube is pulling away from the skin or starts to hurt STOP! Then start over again more gently.     Strip the drain each time you empty it.

## 2021-02-09 NOTE — TELEPHONE ENCOUNTER
FUTURE VISIT INFORMATION      FUTURE VISIT INFORMATION:    Date: 5.25.21    Time: 3 PM    Location:  [facility]  REFERRAL INFORMATION:    Referring provider:  Dr. Rodriguez    Referring providers clinic: [facility]    Reason for visit/diagnosis  new top    RECORDS REQUESTED FROM:       *no records to gather

## 2021-02-15 DIAGNOSIS — Z11.59 ENCOUNTER FOR SCREENING FOR OTHER VIRAL DISEASES: ICD-10-CM

## 2021-02-15 LAB
LABORATORY COMMENT REPORT: NORMAL
SARS-COV-2 RNA RESP QL NAA+PROBE: NEGATIVE
SARS-COV-2 RNA RESP QL NAA+PROBE: NORMAL
SPECIMEN SOURCE: NORMAL
SPECIMEN SOURCE: NORMAL

## 2021-02-15 PROCEDURE — U0003 INFECTIOUS AGENT DETECTION BY NUCLEIC ACID (DNA OR RNA); SEVERE ACUTE RESPIRATORY SYNDROME CORONAVIRUS 2 (SARS-COV-2) (CORONAVIRUS DISEASE [COVID-19]), AMPLIFIED PROBE TECHNIQUE, MAKING USE OF HIGH THROUGHPUT TECHNOLOGIES AS DESCRIBED BY CMS-2020-01-R: HCPCS | Mod: 90 | Performed by: PATHOLOGY

## 2021-02-15 PROCEDURE — U0005 INFEC AGEN DETEC AMPLI PROBE: HCPCS | Mod: 90 | Performed by: PATHOLOGY

## 2021-02-15 PROCEDURE — 99000 SPECIMEN HANDLING OFFICE-LAB: CPT | Performed by: PATHOLOGY

## 2021-02-16 ENCOUNTER — ANESTHESIA EVENT (OUTPATIENT)
Dept: SURGERY | Facility: CLINIC | Age: 26
End: 2021-02-16
Payer: COMMERCIAL

## 2021-02-16 ASSESSMENT — COPD QUESTIONNAIRES: COPD: 0

## 2021-02-16 ASSESSMENT — LIFESTYLE VARIABLES: TOBACCO_USE: 0

## 2021-02-17 ENCOUNTER — ANESTHESIA (OUTPATIENT)
Dept: SURGERY | Facility: CLINIC | Age: 26
End: 2021-02-17
Payer: COMMERCIAL

## 2021-02-17 ENCOUNTER — HOSPITAL ENCOUNTER (OUTPATIENT)
Facility: CLINIC | Age: 26
Discharge: HOME | End: 2021-02-17
Attending: SURGERY | Admitting: SURGERY
Payer: COMMERCIAL

## 2021-02-17 VITALS
BODY MASS INDEX: 35.37 KG/M2 | DIASTOLIC BLOOD PRESSURE: 74 MMHG | RESPIRATION RATE: 14 BRPM | OXYGEN SATURATION: 98 % | TEMPERATURE: 98.2 F | WEIGHT: 212.3 LBS | SYSTOLIC BLOOD PRESSURE: 126 MMHG | HEIGHT: 65 IN | HEART RATE: 69 BPM

## 2021-02-17 DIAGNOSIS — F64.0 GENDER DYSPHORIA IN ADOLESCENT AND ADULT: ICD-10-CM

## 2021-02-17 LAB
GLUCOSE BLDC GLUCOMTR-MCNC: 78 MG/DL (ref 70–99)
HCG UR QL: NEGATIVE

## 2021-02-17 PROCEDURE — 250N000011 HC RX IP 250 OP 636: Performed by: NURSE ANESTHETIST, CERTIFIED REGISTERED

## 2021-02-17 PROCEDURE — 258N000003 HC RX IP 258 OP 636: Performed by: NURSE ANESTHETIST, CERTIFIED REGISTERED

## 2021-02-17 PROCEDURE — 370N000017 HC ANESTHESIA TECHNICAL FEE, PER MIN: Performed by: SURGERY

## 2021-02-17 PROCEDURE — 81025 URINE PREGNANCY TEST: CPT | Performed by: ANESTHESIOLOGY

## 2021-02-17 PROCEDURE — 250N000011 HC RX IP 250 OP 636: Performed by: SURGERY

## 2021-02-17 PROCEDURE — 710N000012 HC RECOVERY PHASE 2, PER MINUTE: Performed by: SURGERY

## 2021-02-17 PROCEDURE — 999N000141 HC STATISTIC PRE-PROCEDURE NURSING ASSESSMENT: Performed by: SURGERY

## 2021-02-17 PROCEDURE — 88305 TISSUE EXAM BY PATHOLOGIST: CPT | Mod: TC | Performed by: SURGERY

## 2021-02-17 PROCEDURE — 250N000009 HC RX 250: Performed by: NURSE ANESTHETIST, CERTIFIED REGISTERED

## 2021-02-17 PROCEDURE — 999N001017 HC STATISTIC GLUCOSE BY METER IP

## 2021-02-17 PROCEDURE — 271N000002 HC RX 271: Performed by: SURGERY

## 2021-02-17 PROCEDURE — 272N000001 HC OR GENERAL SUPPLY STERILE: Performed by: SURGERY

## 2021-02-17 PROCEDURE — 88305 TISSUE EXAM BY PATHOLOGIST: CPT | Mod: 26 | Performed by: PATHOLOGY

## 2021-02-17 PROCEDURE — 250N000025 HC SEVOFLURANE, PER MIN: Performed by: SURGERY

## 2021-02-17 PROCEDURE — 250N000009 HC RX 250: Performed by: SURGERY

## 2021-02-17 PROCEDURE — 710N000010 HC RECOVERY PHASE 1, LEVEL 2, PER MIN: Performed by: SURGERY

## 2021-02-17 PROCEDURE — 360N000076 HC SURGERY LEVEL 3, PER MIN: Performed by: SURGERY

## 2021-02-17 PROCEDURE — 250N000013 HC RX MED GY IP 250 OP 250 PS 637: Performed by: STUDENT IN AN ORGANIZED HEALTH CARE EDUCATION/TRAINING PROGRAM

## 2021-02-17 RX ORDER — HYDROMORPHONE HYDROCHLORIDE 1 MG/ML
.3-.5 INJECTION, SOLUTION INTRAMUSCULAR; INTRAVENOUS; SUBCUTANEOUS EVERY 10 MIN PRN
Status: DISCONTINUED | OUTPATIENT
Start: 2021-02-17 | End: 2021-02-17 | Stop reason: HOSPADM

## 2021-02-17 RX ORDER — CEFAZOLIN SODIUM 2 G/100ML
2 INJECTION, SOLUTION INTRAVENOUS
Status: COMPLETED | OUTPATIENT
Start: 2021-02-17 | End: 2021-02-17

## 2021-02-17 RX ORDER — SODIUM CHLORIDE, SODIUM LACTATE, POTASSIUM CHLORIDE, CALCIUM CHLORIDE 600; 310; 30; 20 MG/100ML; MG/100ML; MG/100ML; MG/100ML
INJECTION, SOLUTION INTRAVENOUS CONTINUOUS
Status: DISCONTINUED | OUTPATIENT
Start: 2021-02-17 | End: 2021-02-17 | Stop reason: HOSPADM

## 2021-02-17 RX ORDER — AZITHROMYCIN 250 MG/1
TABLET, FILM COATED ORAL
Qty: 6 TABLET | Refills: 0 | Status: SHIPPED | OUTPATIENT
Start: 2021-02-17 | End: 2021-02-22

## 2021-02-17 RX ORDER — NALOXONE HYDROCHLORIDE 0.4 MG/ML
0.2 INJECTION, SOLUTION INTRAMUSCULAR; INTRAVENOUS; SUBCUTANEOUS
Status: DISCONTINUED | OUTPATIENT
Start: 2021-02-17 | End: 2021-02-17 | Stop reason: HOSPADM

## 2021-02-17 RX ORDER — MEPERIDINE HYDROCHLORIDE 25 MG/ML
12.5 INJECTION INTRAMUSCULAR; INTRAVENOUS; SUBCUTANEOUS
Status: DISCONTINUED | OUTPATIENT
Start: 2021-02-17 | End: 2021-02-17 | Stop reason: HOSPADM

## 2021-02-17 RX ORDER — ONDANSETRON 4 MG/1
4 TABLET, ORALLY DISINTEGRATING ORAL EVERY 30 MIN PRN
Status: DISCONTINUED | OUTPATIENT
Start: 2021-02-17 | End: 2021-02-17 | Stop reason: HOSPADM

## 2021-02-17 RX ORDER — ONDANSETRON 2 MG/ML
INJECTION INTRAMUSCULAR; INTRAVENOUS PRN
Status: DISCONTINUED | OUTPATIENT
Start: 2021-02-17 | End: 2021-02-17

## 2021-02-17 RX ORDER — HYDRALAZINE HYDROCHLORIDE 20 MG/ML
2.5-5 INJECTION INTRAMUSCULAR; INTRAVENOUS EVERY 10 MIN PRN
Status: DISCONTINUED | OUTPATIENT
Start: 2021-02-17 | End: 2021-02-17 | Stop reason: HOSPADM

## 2021-02-17 RX ORDER — LABETALOL HYDROCHLORIDE 5 MG/ML
10 INJECTION, SOLUTION INTRAVENOUS
Status: DISCONTINUED | OUTPATIENT
Start: 2021-02-17 | End: 2021-02-17 | Stop reason: HOSPADM

## 2021-02-17 RX ORDER — CEFAZOLIN SODIUM 1 G/3ML
1 INJECTION, POWDER, FOR SOLUTION INTRAMUSCULAR; INTRAVENOUS SEE ADMIN INSTRUCTIONS
Status: DISCONTINUED | OUTPATIENT
Start: 2021-02-17 | End: 2021-02-17 | Stop reason: HOSPADM

## 2021-02-17 RX ORDER — ACETAMINOPHEN 325 MG/1
975 TABLET ORAL ONCE
Status: COMPLETED | OUTPATIENT
Start: 2021-02-17 | End: 2021-02-17

## 2021-02-17 RX ORDER — BUPIVACAINE HYDROCHLORIDE 2.5 MG/ML
INJECTION, SOLUTION EPIDURAL; INFILTRATION; INTRACAUDAL PRN
Status: DISCONTINUED | OUTPATIENT
Start: 2021-02-17 | End: 2021-02-17 | Stop reason: HOSPADM

## 2021-02-17 RX ORDER — DEXAMETHASONE SODIUM PHOSPHATE 4 MG/ML
4 INJECTION, SOLUTION INTRA-ARTICULAR; INTRALESIONAL; INTRAMUSCULAR; INTRAVENOUS; SOFT TISSUE EVERY 10 MIN PRN
Status: DISCONTINUED | OUTPATIENT
Start: 2021-02-17 | End: 2021-02-17 | Stop reason: HOSPADM

## 2021-02-17 RX ORDER — NALOXONE HYDROCHLORIDE 0.4 MG/ML
0.4 INJECTION, SOLUTION INTRAMUSCULAR; INTRAVENOUS; SUBCUTANEOUS
Status: DISCONTINUED | OUTPATIENT
Start: 2021-02-17 | End: 2021-02-17 | Stop reason: HOSPADM

## 2021-02-17 RX ORDER — PROPOFOL 10 MG/ML
INJECTION, EMULSION INTRAVENOUS CONTINUOUS PRN
Status: DISCONTINUED | OUTPATIENT
Start: 2021-02-17 | End: 2021-02-17

## 2021-02-17 RX ORDER — PROPOFOL 10 MG/ML
INJECTION, EMULSION INTRAVENOUS PRN
Status: DISCONTINUED | OUTPATIENT
Start: 2021-02-17 | End: 2021-02-17

## 2021-02-17 RX ORDER — SODIUM CHLORIDE, SODIUM LACTATE, POTASSIUM CHLORIDE, CALCIUM CHLORIDE 600; 310; 30; 20 MG/100ML; MG/100ML; MG/100ML; MG/100ML
INJECTION, SOLUTION INTRAVENOUS CONTINUOUS PRN
Status: DISCONTINUED | OUTPATIENT
Start: 2021-02-17 | End: 2021-02-17

## 2021-02-17 RX ORDER — OXYCODONE HYDROCHLORIDE 10 MG/1
5-10 TABLET ORAL EVERY 6 HOURS PRN
Qty: 20 TABLET | Refills: 0 | Status: SHIPPED | OUTPATIENT
Start: 2021-02-17 | End: 2021-02-26

## 2021-02-17 RX ORDER — FENTANYL CITRATE 50 UG/ML
INJECTION, SOLUTION INTRAMUSCULAR; INTRAVENOUS PRN
Status: DISCONTINUED | OUTPATIENT
Start: 2021-02-17 | End: 2021-02-17

## 2021-02-17 RX ORDER — LIDOCAINE 40 MG/G
CREAM TOPICAL
Status: DISCONTINUED | OUTPATIENT
Start: 2021-02-17 | End: 2021-02-17 | Stop reason: HOSPADM

## 2021-02-17 RX ORDER — LIDOCAINE HYDROCHLORIDE 20 MG/ML
INJECTION, SOLUTION INFILTRATION; PERINEURAL PRN
Status: DISCONTINUED | OUTPATIENT
Start: 2021-02-17 | End: 2021-02-17

## 2021-02-17 RX ORDER — FENTANYL CITRATE 50 UG/ML
25-50 INJECTION, SOLUTION INTRAMUSCULAR; INTRAVENOUS
Status: DISCONTINUED | OUTPATIENT
Start: 2021-02-17 | End: 2021-02-17 | Stop reason: HOSPADM

## 2021-02-17 RX ORDER — DEXAMETHASONE SODIUM PHOSPHATE 4 MG/ML
INJECTION, SOLUTION INTRA-ARTICULAR; INTRALESIONAL; INTRAMUSCULAR; INTRAVENOUS; SOFT TISSUE PRN
Status: DISCONTINUED | OUTPATIENT
Start: 2021-02-17 | End: 2021-02-17

## 2021-02-17 RX ORDER — DIMENHYDRINATE 50 MG/ML
25 INJECTION, SOLUTION INTRAMUSCULAR; INTRAVENOUS
Status: DISCONTINUED | OUTPATIENT
Start: 2021-02-17 | End: 2021-02-17 | Stop reason: HOSPADM

## 2021-02-17 RX ORDER — ONDANSETRON 2 MG/ML
4 INJECTION INTRAMUSCULAR; INTRAVENOUS EVERY 30 MIN PRN
Status: DISCONTINUED | OUTPATIENT
Start: 2021-02-17 | End: 2021-02-17 | Stop reason: HOSPADM

## 2021-02-17 RX ORDER — ONDANSETRON 4 MG/1
4 TABLET, ORALLY DISINTEGRATING ORAL EVERY 8 HOURS PRN
Qty: 12 TABLET | Refills: 0 | Status: SHIPPED | OUTPATIENT
Start: 2021-02-17 | End: 2021-02-26

## 2021-02-17 RX ADMIN — Medication: at 16:35

## 2021-02-17 RX ADMIN — ONDANSETRON 4 MG: 2 INJECTION INTRAMUSCULAR; INTRAVENOUS at 17:05

## 2021-02-17 RX ADMIN — ACETAMINOPHEN 975 MG: 325 TABLET, FILM COATED ORAL at 19:53

## 2021-02-17 RX ADMIN — PHENYLEPHRINE HYDROCHLORIDE 100 MCG: 10 INJECTION INTRAVENOUS at 15:57

## 2021-02-17 RX ADMIN — PHENYLEPHRINE HYDROCHLORIDE 100 MCG: 10 INJECTION INTRAVENOUS at 13:20

## 2021-02-17 RX ADMIN — CEFAZOLIN 2 G: 10 INJECTION, POWDER, FOR SOLUTION INTRAVENOUS at 13:30

## 2021-02-17 RX ADMIN — HYDROMORPHONE HYDROCHLORIDE 0.25 MG: 1 INJECTION, SOLUTION INTRAMUSCULAR; INTRAVENOUS; SUBCUTANEOUS at 14:23

## 2021-02-17 RX ADMIN — PHENYLEPHRINE HYDROCHLORIDE 100 MCG: 10 INJECTION INTRAVENOUS at 14:26

## 2021-02-17 RX ADMIN — HYDROMORPHONE HYDROCHLORIDE 0.5 MG: 1 INJECTION, SOLUTION INTRAMUSCULAR; INTRAVENOUS; SUBCUTANEOUS at 16:36

## 2021-02-17 RX ADMIN — DEXAMETHASONE SODIUM PHOSPHATE 4 MG: 4 INJECTION, SOLUTION INTRAMUSCULAR; INTRAVENOUS at 13:03

## 2021-02-17 RX ADMIN — ROCURONIUM BROMIDE 50 MG: 10 INJECTION INTRAVENOUS at 13:05

## 2021-02-17 RX ADMIN — ACETAMINOPHEN 975 MG: 325 TABLET, FILM COATED ORAL at 12:13

## 2021-02-17 RX ADMIN — PHENYLEPHRINE HYDROCHLORIDE 100 MCG: 10 INJECTION INTRAVENOUS at 16:15

## 2021-02-17 RX ADMIN — SODIUM CHLORIDE, POTASSIUM CHLORIDE, SODIUM LACTATE AND CALCIUM CHLORIDE: 600; 310; 30; 20 INJECTION, SOLUTION INTRAVENOUS at 12:56

## 2021-02-17 RX ADMIN — FENTANYL CITRATE 75 MCG: 50 INJECTION, SOLUTION INTRAMUSCULAR; INTRAVENOUS at 13:03

## 2021-02-17 RX ADMIN — SODIUM CHLORIDE, POTASSIUM CHLORIDE, SODIUM LACTATE AND CALCIUM CHLORIDE: 600; 310; 30; 20 INJECTION, SOLUTION INTRAVENOUS at 15:27

## 2021-02-17 RX ADMIN — PHENYLEPHRINE HYDROCHLORIDE 100 MCG: 10 INJECTION INTRAVENOUS at 13:40

## 2021-02-17 RX ADMIN — SUGAMMADEX 200 MG: 100 INJECTION, SOLUTION INTRAVENOUS at 17:11

## 2021-02-17 RX ADMIN — HYDROMORPHONE HYDROCHLORIDE 0.25 MG: 1 INJECTION, SOLUTION INTRAMUSCULAR; INTRAVENOUS; SUBCUTANEOUS at 15:10

## 2021-02-17 RX ADMIN — FENTANYL CITRATE 50 MCG: 50 INJECTION, SOLUTION INTRAMUSCULAR; INTRAVENOUS at 15:35

## 2021-02-17 RX ADMIN — FENTANYL CITRATE 25 MCG: 50 INJECTION, SOLUTION INTRAMUSCULAR; INTRAVENOUS at 13:05

## 2021-02-17 RX ADMIN — PROPOFOL 30 MCG/KG/MIN: 10 INJECTION, EMULSION INTRAVENOUS at 13:25

## 2021-02-17 RX ADMIN — PHENYLEPHRINE HYDROCHLORIDE 100 MCG: 10 INJECTION INTRAVENOUS at 15:45

## 2021-02-17 RX ADMIN — LIDOCAINE HYDROCHLORIDE 100 MG: 20 INJECTION, SOLUTION INFILTRATION; PERINEURAL at 13:03

## 2021-02-17 RX ADMIN — CEFAZOLIN 1 G: 10 INJECTION, POWDER, FOR SOLUTION INTRAVENOUS at 15:30

## 2021-02-17 RX ADMIN — PROPOFOL 180 MG: 10 INJECTION, EMULSION INTRAVENOUS at 13:03

## 2021-02-17 RX ADMIN — PHENYLEPHRINE HYDROCHLORIDE 100 MCG: 10 INJECTION INTRAVENOUS at 14:16

## 2021-02-17 RX ADMIN — MIDAZOLAM 2 MG: 1 INJECTION INTRAMUSCULAR; INTRAVENOUS at 12:53

## 2021-02-17 ASSESSMENT — MIFFLIN-ST. JEOR: SCORE: 1708.88

## 2021-02-17 NOTE — DISCHARGE INSTRUCTIONS
ON-Q  C-bloc Continuous Nerve Block Discharge Instructions  The Nerve Block:       Your anesthesiologist performed a nerve block (a procedure that blocks pain to only a specific area) by inserting a small tube in your body.  This tube is connected to a pump that will help control your pain.    The pump is shaped like a balloon and is filled with medicine that causes numbness or loss of sensation to help control your pain around the incision or site of injury.  The pain pump DOES NOT contain controlled substances.      The medicine in the pump may alter your ability to feel changes in temperature or pressure. Your doctor will tell you if any special precautions apply to you.       The Pump      DO NOT SQUEEZE THE PUMP.     The pump delivers medicine at a very slow rate.    You will NOT see the medicine moving through the tubing.    As the medicine is delivered, the pump ball will slowly become smaller.    It may take a day or so before you notice a change in the size and look of the pump.    Depending on the size of your pump, it may take 2 - 5 days to give all the medicine.    The middle part of the pump may look like an apple core when empty.  Managing Your Pain  The Medicine and Infusion Rate:   0.2% Ropivacaine 4mL / hr            The continuous nerve block infusion may not block all of the pain from your surgery so it is important that you take the pain medicines prescribed by your surgeon if you need them.      If you continue to have difficulty with your pain control, please page or call the anesthesiologist.  Caring for Your Pump at Home    Wear the pump on the outside of clothing - away from your skin and cold therapy (ice packs).  The delivery rate is accurate only at room temperature.    Make sure the white clamp on the tubing remains open (moves freely on the tubing).    Make sure there are no kinks in the tubing.    Do not tape or cover up the filter.    Protect the pump from sunlight and heat.    When  sleeping:   o Do not place the pump underneath the bed covers where the pump may become too warm.  o Do not place the pump on the floor or hang the pump on a bed post as these situations may cause the tubing to get tangled and get pulled out.    Bathing/Showering:    o We recommend taking sponge baths until the pump is removed.  o It is important to not get the area where the tube enters your body wet.    It is normal for a small amount of fluid to leak from the hole in your body where the tube is inserted.  If this occurs, reinforce the bandage with another bandage.  The Infusion    Do not turn the infusion off unless your anesthesiologist has told you to do so.    Do not change the flow rate of the infusion unless your anesthesiologist told you to do so.  Changing the flow rate without your doctor s instruction may result in the wrong dose of medicine, which could cause serious injury.    If your anesthesiologist told you to change the rate of your infusion:  o Flip open the clear cover.  o Turn the white key on the select-a-flow dial to the instructed rate.  (The rate of the infusion should line up with the black arrow at the top of the controller.)    o Listen for the click when you move the dial.  Removing the Tubing    When the pump is empty or if you have been told to stop the infusion you can remove the tubing.  Follow these steps:  o Wash your hands.  o Clamp the tubing (squeeze the white clamp until you hear or feel a click).  o Remove the clear dressing that covers the tubing.  o Grasp the tubing close to the skin and gently pull.  If you meet resistance, stop pulling and page or call your anesthesiologist.  o DO NOT cut or forcefully remove the tubing.  o After removal, check the end of the tubing for a dark tip.  If you do not see a dark tip, page or call your anesthesiologist.  o Apply firm pressure over the site until oozing stops.  Wash the area with soap and water, dry with a clean towel and then  cover with a bandage.      The pump is not reusable. Dispose of it in the trash and wash your hands.   Troubleshooting    Tubing Comes Out From Skin:  If the tube accidentally comes out, check the end of the tube for a dark tip.    If you see a dark tip simply discard it and use the pain pills prescribed to you by your surgeon.    If you don t see a dark tip, page or call the anesthesiologist.    Tubing Disconnection:  If the tubing accidentally becomes disconnected from the pump, DO NOT reconnect the pump to the tubing.  It may have been contaminated with germs.  Close the tubing clamp and immediately page or call your anesthesiologist.    Fluid Leaking:  If enough fluid is leaking from the pump or the tubing outside your body to soak through the dressing, close the white clamp on the tubing and page or call your anesthesiologist.    Immediately report the following to your anesthesiologist:    Redness, warmth, swelling, or tenderness at the site the tubing was inserted    Increase in pain    Fever, chills, sweats    Bowel or bladder changes    Difficulty breathing    Dizziness, lightheadedness    Blurred vision    Ringing or buzzing in your ears    Metal taste in your mouth    Numbness and/or tingling around your mouth, fingers or toes    Drowsiness    Confusion    Trouble removing the tubing    Dark tip is not present when tubing is removed         Notifying your Anesthesiologist  o Page:  Dial [phone], then enter [phone].  You will be prompted to enter your phone number and then the # sign.  The anesthesiologist will call you back.  o Call:  Dial [phone].  Ask to speak to the anesthesiologist on call for the [facility].           Caring for Your Adam-Burleson Drain    You have been discharged with a Adam-Burleson drainage tube. This tube drains fluid from your incision, helping prevent swelling and reducing the risk for infection. The tube is held in place by a few stitches. The  drain will be removed when your doctor determines you no longer need it and when the amount of drainage decreases. The color and amount of fluid varies. Right after surgery, the fluid may be bright red and may become clearer over time.   Dressing:    Keep the skin around the tube dry.    If you have a dressing, change it every day.   o Wash your hands.  o Remove the old bandage. Do not use scissors-you may accidentally cut the tube.  o Check for any redness, swelling, drainage, or broken stitches. (Call your doctor with any of these findings).  o Wash your hands again.  o Wet a cotton swab (Q-tip) and clean around the incision and the tube site. Use normal saline solution (salt and water) or soap and water. Start at the tube site and move outward in a circular motion.   o Pat dry.  o Put a new bandage on the incision and tube site. Make the bandage large enough to cover the whole incision area.   o Tape the bandage in place.  o Throw out old materials and wash your hands.   Home Care:    Tape the tube to the skin below the bandage. Make sure to keep some slack in the tube to keep it from pulling out.     DO NOT sleep on the same side as the tube.    Secure the tube and bulb inside your clothing with a safety pin. This helps keep the tube from being pulled out.     Keep the bulb compressed at all times, except when you empty it.    Empty your drain at least twice a day. Empty it more often if the drain is full.   o Lift the opening of the drain.  o Drain the fluid into a measuring cup.  o Record the amount of fluid each time you empty. Share the information with your doctor at your follow-up visit.   o Squeeze the bulb with your hands until you hear air coming out of the bulb.  o Close the opening.     Tape plastic wrap over the bandage and tube site when you shower.      Stripping  the tube helps keep blood clots from blocking the tube.                         ONLY DO THIS IF YOUR MD INSTRUCTED YOU TO DO SO!  o Hold  the tubing where it leaves the skin with one hand. This keeps it from pulling on the skin.  o Pinch the tubing with the thumb and first finger of your other hand.   o Slowly and firmly pull your thumb and first finger down the tube (squeezing the tube between your fingers). Keep squeezing the tube as you run your fingers towards the bulb. If the pulling hurts or feels like it is coming out of the skin, STOP. Begin again more gently.  o Let go of the tubing with both hands. If the tube is still blocked, repeat these steps three or four times. Make sure that the bulb is compressed so it creates suction.  When to call your doctor:    New or increased pain around the tube    Redness, warmth, or swelling around the incision or tube    Drainage that is foul smelling    Vomiting    Fever over 101 F degrees    Fluid leaking around the tube    Incision seems not to be healing    Stitches become loose    The tube falls out    Drainage that changes from light pick to dark red    A sudden increase or decrease in the amount of drainage (over 30 ml).  Your drainage record:  Date Time Bulb 1: Amount of drainage (ml or cc) Bulb 2: Amount of drainage (ml or cc) Notes                                                                                            Rev. 4/2014      Same-Day Surgery   Adult Discharge Orders & Instructions     For 24 hours after surgery:  1. Get plenty of rest.  A responsible adult must stay with you for at least 24 hours after you leave the hospital.   2. Pain medication can slow your reflexes. Do not drive or use heavy equipment.  If you have weakness or tingling, don't drive or use heavy equipment until this feeling goes away.  3. Mixing alcohol and pain medication can cause dizziness and slow your breathing. It can even be fatal. Do not drink alcohol while taking pain medication.  4. Avoid strenuous or risky activities.  Ask for help when climbing stairs.   5. You may feel lightheaded.  If so, sit for a  few minutes before standing.  Have someone help you get up.   6. If you have nausea (feel sick to your stomach), drink only clear liquids such as apple juice, ginger ale, broth or 7-Up.  Rest may also help.  Be sure to drink enough fluids.  Move to a regular diet as you feel able. Take pain medications with a small amount of solid food, such as toast or crackers, to avoid nausea.   7. A slight fever is normal. Call the doctor if your fever is over 100 F (37.7 C) (taken under the tongue) or lasts longer than 24 hours.  8. You may have a dry mouth, muscle aches, trouble sleeping or a sore throat.  These symptoms should go away after 24 hours.  9. Do not make important or legal decisions.   Pain Management:      1. Take pain medication (if prescribed) for pain as directed by your physician.        2. WARNING: If the pain medication you have been prescribed contains Tylenol (acetaminophen), DO NOT take additional doses of Tylenol (acetaminophen).     Call your doctor for any of the followin.  Signs of infection (fever, growing tenderness at the surgery site, severe pain, a large amount of drainage or bleeding, foul-smelling drainage, redness, swelling).    2.  It has been over 8 to 10 hours since surgery and you are still not able to urinate (pee).    3.  Headache for over 24 hours.    4.  Numbness, tingling or weakness the day after surgery (if you had spinal anesthesia).  To contact a doctor, call _____________________________________ or:      [phone] and ask for the Resident On Call for:          __________________________________________ (answered 24 hours a day)      Emergency Department:  [facility]: [phone]  [place] [facility]: [phone]               Rev. 10/2014

## 2021-02-17 NOTE — BRIEF OP NOTE
[facility]    Brief Operative Note    Pre-operative diagnosis: Gender dysphoria in adolescent and adult [F64.0]  Post-operative diagnosis Same as pre-operative diagnosis    Procedure: Procedure(s):  bilateral simple mastectomy,  nipple grafts. OnQ  Surgeon: Surgeon(s) and Role:     * Nehal Rodriguez MD - Primary     * Thao Bronson PA-C - Assisting  Anesthesia: General   Estimated blood loss: 50cc  Drains: Adam-Burleson and On-Q pump  Specimens:   ID Type Source Tests Collected by Time Destination   A : left breast Tissue Breast, Left SURGICAL PATHOLOGY EXAM Nehal Rodriguez MD 2/17/2021  2:26 PM    B : right breast Tissue Breast, Right SURGICAL PATHOLOGY EXAM Nehal Rodriguez MD 2/17/2021  2:26 PM      Findings:   None.  Complications: None.  Implants: * No implants in log *    Bilateral simple mastectomies with free nipple grafts. Grafts secured with bolster dressings. Jpx2, on Q pump. Wrapped with kerlix and ace wrap.

## 2021-02-17 NOTE — ANESTHESIA PREPROCEDURE EVALUATION
Anesthesia Pre-Procedure Evaluation    Patient: Roland Frances   MRN: [id number] : 1995        Preoperative Diagnosis: Gender dysphoria in adolescent and adult [F64.0]   Procedure : Procedure(s):  bilateral simple mastectomy,  nipple grafts. OnQ     History reviewed. No pertinent past medical history.   History reviewed. No pertinent surgical history.   Allergies   Allergen Reactions     Cats Cough, Difficulty breathing, Hives, Itching and Shortness Of Breath     No Clinical Screening - See Comments      Seasonal, Strawberries, Cats      Strawberry Hives, Cough and Itching      Social History     Tobacco Use     Smoking status: Never Smoker     Smokeless tobacco: Never Used   Substance Use Topics     Alcohol use: Not Currently      Wt Readings from Last 1 Encounters:   21 99.8 kg (220 lb)        Anesthesia Evaluation            ROS/MED HX  ENT/Pulmonary:     (+) WILL risk factors, obese, allergic rhinitis,  (-) tobacco use, asthma and COPD   Neurologic:  - neg neurologic ROS     Cardiovascular:  - neg cardiovascular ROS     METS/Exercise Tolerance:     Hematologic:  - neg hematologic  ROS     Musculoskeletal:  - neg musculoskeletal ROS     GI/Hepatic:  - neg GI/hepatic ROS     Renal/Genitourinary:  - neg Renal ROS     Endo:     (+) Obesity,     Psychiatric/Substance Use:       Infectious Disease:  - neg infectious disease ROS     Malignancy:  - neg malignancy ROS     Other:  - neg other ROS          Physical Exam    Airway        Mallampati: II   TM distance: > 3 FB   Neck ROM: full   Mouth opening: > 3 cm    Respiratory Devices and Support         Dental  no notable dental history         Cardiovascular   cardiovascular exam normal          Pulmonary   pulmonary exam normal                OUTSIDE LABS:  CBC: No results found for: WBC, HGB, HCT, PLT  BMP: No results found for: NA, POTASSIUM, CHLORIDE, CO2, BUN, CR, GLC  COAGS: No results found for: PTT, INR, FIBR  POC: No results found for: BGM, HCG,  HCGS  HEPATIC: No results found for: ALBUMIN, PROTTOTAL, ALT, AST, GGT, ALKPHOS, BILITOTAL, BILIDIRECT, LILIANA  OTHER: No results found for: PH, LACT, A1C, DIMITRY, PHOS, MAG, LIPASE, AMYLASE, TSH, T4, T3, CRP, SED    Anesthesia Plan    ASA Status:  2   NPO Status:  NPO Appropriate    Anesthesia Type: General.     - Airway: ETT   Induction: Intravenous.   Maintenance: Balanced.   Techniques and Equipment:     - Lines/Monitors: 2nd IV     Consents    Anesthesia Plan(s) and associated risks, benefits, and realistic alternatives discussed. Questions answered and patient/representative(s) expressed understanding.     - Discussed with:  Patient      - Extended Intubation/Ventilatory Support Discussed: no Extended Intubation.      - Patient is DNR/DNI Status: No    Use of blood products discussed: No .     Postoperative Care    Pain management: IV analgesics, Oral pain medications, Multi-modal analgesia.   PONV prophylaxis: Ondansetron (or other 5HT-3), Dexamethasone or Solumedrol     Comments:    26 year old with gender dysphoria, here for bilateral mastectomy by Dr Rodriguez.   Patient has seasonal allergies, and some food allergies including hives to strawberries. No reported drug allergies.   She has a BMI of 36 with no diagnosis of WILL or any known sleep disordered breathing. Patient is overall healthy.       Risks versus benefits discussed. All questions answered             Rahel Samuels MD

## 2021-02-17 NOTE — ANESTHESIA CARE TRANSFER NOTE
Patient: Roland Frances    Procedure(s):  bilateral simple mastectomy,  nipple grafts. OnQ    Diagnosis: Gender dysphoria in adolescent and adult [F64.0]  Diagnosis Additional Information: No value filed.    Anesthesia Type:   General     Note:    Oropharynx: oropharynx clear of all foreign objects and spontaneously breathing  Level of Consciousness: drowsy  Oxygen Supplementation: face mask  Level of Supplemental Oxygen (L/min / FiO2): 6  Independent Airway: airway patency satisfactory and stable  Dentition: dentition unchanged  Vital Signs Stable: post-procedure vital signs reviewed and stable  Report to RN Given: handoff report given  Patient transferred to: PACU    Handoff Report: Identifed the Patient, Identified the Reponsible Provider, Reviewed the pertinent medical history, Discussed the surgical course, Reviewed Intra-OP anesthesia mangement and issues during anesthesia, Set expectations for post-procedure period and Allowed opportunity for questions and acknowledgement of understanding      Vitals: (Last set prior to Anesthesia Care Transfer)  CRNA VITALS  2/17/2021 1709 - 2/17/2021 1746      2/17/2021             Resp Rate (observed):  (!) 4        Electronically Signed By: OSCAR TY APRN CRNA  February 17, 2021  5:46 PM

## 2021-02-17 NOTE — ANESTHESIA PROCEDURE NOTES
Airway   Date/Time: 2/17/2021 1:09 PM   Patient location during procedure: OR  Staff -   Anesthesiologist:  Andrey Church MD  Resident/Fellow: Edison Rao  Performed By: resident and anesthesiologist    Consent for Airway   Urgency: elective    Indications and Patient Condition  Indications for airway management: eyal-procedural  Induction type:intravenousMask difficulty assessment: 1 - vent by mask    Final Airway Details  Final airway type: endotracheal airway  Successful airway:ETT - single and Oral  Endotracheal Airway Details   ETT size (mm): 7.0  Cuffed: yes  Successful intubation technique: video laryngoscopy  Grade View of Cords: 1  Adjucts: stylet  Measured from: lips  Secured at (cm): 21  Secured with: commercial tube souza and plastic tape  Bite block used: None    Post intubation assessment   Placement verified by: capnometry, equal breath sounds and chest rise   Number of attempts at approach: 1  Secured with:commercial tube souza and plastic tape  Ease of procedure: easy  Dentition: Unchanged and Intact

## 2021-02-18 NOTE — OR NURSING
Discharge instructions given to patient and patient's significant other Blanca. Verbalized understanding and stated no questions at this time.

## 2021-02-18 NOTE — OR NURSING
Patient reporting some numbness to left hand thumb and 1st and 2nd fingers. Dr. Floyd called to notify. Stated to have patient monitor and if it becomes worse or doesn't improve in 24 hours to call anesthesia.     Patient and patient's significant other and responsible adult Blanca educated. They will monitor and call/come in if needed.

## 2021-02-18 NOTE — OP NOTE
Procedure Date: 02/17/2021      ATTENDING:  Nehal Rodriguez MD      ASSISTANT:  Thao Bronson PA-C (no resident available.  KERRY did 50% of the case).      SECOND ASSISTANT:  AUGUSTA Espinoza.      PREOPERATIVE DIAGNOSIS:  Gender dysphoria.      POSTOPERATIVE DIAGNOSIS:  Gender dysphoria.      PROCEDURE:  Bilateral simple mastectomies with nipple graft reconstruction and On-Q catheter placement.      ANESTHESIA:  GET.      ESTIMATED BLOOD LOSS:  50 mL      INTRAVENOUS FLUIDS:  1400 mL      URINE OUTPUT:  325 mL      COUNTS:  Correct.      COMPLICATIONS:  None.      DRAINS:  AYLIN x 2.      SPECIMENS:  Right breast 783 grams, left breast 945 grams.      INDICATIONS:  This is a 26-year-old biological female on the gender spectrum with a diagnosis of gender dysphoria.  They met WPATH and insurance criteria for gender affirming top surgery.  Due to the patient's breast volume and ptosis, they would require a double incision approach to their bilateral simple mastectomies and desired nipple graft reconstruction.      DESCRIPTION OF PROCEDURE:  The patient was seen in the preoperative waiting area.  The operative sites were marked.  This included sternal notch, sternal midline, inframammary folds with possible extension laterally for dog ears, vertical line through the nipple-areolar complex and a transverse line from mid humerus, as well as possible inferior border of the pec major muscle.  Informed consent was obtained after reviewing the possible risks and complications including but not limited to the following:  Infection, bleeding, hematoma, seroma formation, poor healing, possible spitting suture, possible dehiscence, possible hypertrophic scarring, possible partial or complete failure of the nipple graft, possible injury to surrounding neurovascular and musculoskeletal structures as well as intrathoracic and intra-axillary structures, possible asymmetries and residual deformities, possible need for  further surgery, possible altered sensation of the chest wall, including hyper or hyposensitivity, and possible anesthetic risks such as DVT, PE and cardiopulmonary arrest.  The patient was then brought to the operating room and placed supine on the OR table.  After general anesthesia was administered, the patient was oral endotracheally intubated and a Levy was placed.  The patient already had sequential compression devices on the lower extremities prior to induction.  Arms were secured to arm boards with Ace wraps.  Thighs and forelegs were supported with pillows and secured with padded safety straps.  A lower Sandy Hugger was placed.  The patient was then put into a sitting position and adjustments were made to gain symmetry on the table.  The chest breast area was then prepped and draped in the usual sterile fashion using ChloraPrep.      After a timeout was taken and the proper patient and procedure were identified, we used the PEAK PlasmaBlade on the left side and [facility] cautery on the right.  Our IMF incisions were developed and we left about 2 cm of subcutaneous fat along the IMF incision before beveling down to the pectoral fascia.  Breast mound was elevated off the pectoral fascia superiorly towards the clavicle, medially towards parasternal border and laterally past the lateral border of the pectoralis major muscle.  Of note, the patient had relatively uniform chest musculature and costochondral junctions.  There was a little bit of tapering in the inferolateral corners.  The breast mound was then displaced inferiorly and marked where it overlapped with the IMF incision.  This was very close to our preoperative markings.  The nipple areolar complex was then sharply harvested and kept wrapped in normal saline gauze on the back table, marked per side.  We then amputated the breast mound and did some additional thinning of the skin flap superiorly.  When we were happy with our contour and our symmetry,  the incisions were temporarily stapled and the patient put into a sitting position.  This allowed us to make further adjustments with regards to symmetry of incisional level as well as shape.  All tissues removed were added to the path specimen and sent to pathology in a timely fashion for histologic examination.  When we were happy with our contour and our incisions and skin flap, the dissection pocket was irrigated with Ancef solution.  Hemostasis was achieved with cautery.  Then #15 round drains were introduced through a separate stab wound incision laterally and secured with 3-0 nylon suture.  Dual 10-inch On-Q catheters were percutaneously introduced from the epigastrium and draped along the superior portion of the dissection pocket.  Definitive closure was then achieved with 3-0 Vicryl deep dermal buried sutures followed by 4-0 Vicryl running subcuticular suture.  AYLIN drains were trimmed and put to bulb suction.  The patient was then put into a sitting position and using a nipple template, an about 1.5 cm diameter allowed us to locate the most aesthetic appearance for the nipple graft placement.  This was then deepithelialized sharply with a #15 blade.  It was quite bloody, but we controlled this with direct pressure.  The nipple grafts were then aggressively thinned on the back table and additional nipple and areola were excised to fit the recipient site.  This was secured with 5-0 fast absorbing gut interrupted and running cuticular suture as well as central nipple anchoring.  An 18-gauge needle was used to pie crust the nipple graft.  A bolster dressing consisting of Xeroform sheet and antibiotic-soaked cotton balls was held in place with skin staples and 2-0 silk tie-overs.  Exofin Dermabond was applied to our incisions.  ABDs for drain sponges and Kerlix fluffs for padding on the anterior chest were then wrapped with a double long 6-inch Ace wrap.  Levy was removed.  The patient was extubated and  transferred to a stretcher.  The On-Q catheters were attached to the reservoir containing 550 mL of 0.2% ropivacaine to be delivered at 2 mL per hour per catheter for the next 5 days.        The patient was taken to the recovery room in satisfactory condition, having tolerated the procedure without difficulty or complication.        Final weights from the tissue sent to pathology, right breast 783 grams, left breast 945 grams.         NAVEEN BELLO MD             D: 2021   T: 2021   MT: BHARATHI      Name:     SNADRA VEGA   MRN:      [id number]        Account:        [id number]   :      1995           Procedure Date: 2021      Document: [id number]

## 2021-02-18 NOTE — ANESTHESIA POSTPROCEDURE EVALUATION
Patient: Roland Frances    Procedure(s):  bilateral simple mastectomy,  nipple grafts. OnQ    Diagnosis:Gender dysphoria in adolescent and adult [F64.0]  Diagnosis Additional Information: No value filed.    Anesthesia Type:  General    Note:  Disposition: Outpatient   Postop Pain Control: Uneventful            Sign Out: Well controlled pain   PONV: No   Neuro/Psych: Uneventful            Sign Out: Acceptable/Baseline neuro status   Airway/Respiratory: Uneventful            Sign Out: Acceptable/Baseline resp. status   CV/Hemodynamics: Uneventful            Sign Out: Acceptable CV status   Other NRE: NONE   DID A NON-ROUTINE EVENT OCCUR? No         Last vitals:  Vitals:    02/17/21 1745 02/17/21 1800 02/17/21 1815   BP: 126/81 125/87 124/83   Pulse: 95 73 93   Resp: 14 10 18   Temp: 36.1  C (97  F)     SpO2: 100% 99% 98%       Last vitals prior to Anesthesia Care Transfer:  CRNA VITALS  2/17/2021 1709 - 2/17/2021 1809      2/17/2021             Resp Rate (observed):  (!) 4          Electronically Signed By: Radhika Floyd MD  February 17, 2021  6:23 PM

## 2021-02-23 LAB — COPATH REPORT: NORMAL

## 2021-02-24 NOTE — PROGRESS NOTES
"Plastic Surgery Outpatient Visit    ID: Roland Frances is a 26 year old transgender male / female-to-male s/p bilateral mastectomy with nipple grafts 2/17 with Dr. Rodriguez    S: Doing well, no longer taking pain meds. Drains meeting criteria for removal.     O:  /67 (BP Location: Left arm, Patient Position: Sitting, Cuff Size: Adult Large)   Pulse 64   Temp 98.1  F (36.7  C) (Oral)   Ht 1.651 m (5' 5\")   Wt 97.7 kg (215 lb 6.4 oz)   SpO2 100%   BMI 35.84 kg/m     General: NAD  Chest: bilateral chest incisions c/d/i with tape intact. Nipples well adhered bilaterally. Mild residual ecchymosis medially. Overall good contour and shape.     PATH: FINAL DIAGNOSIS:   A. Left breast, simple mastectomy with nipple graft (958 g):   - Benign breast tissue   - No atypical or malignant findings     B. Right breast, simple mastectomy with nipple graft (796 g):   - Benign breast tissue   - No atypical or malignant findings     A/P:  -healing well  -drains removed today  -nipple dressings x1 week  -wrap x1 week  - tape off in 2 weeks.  -Trex/5lb lifting restrictions x2 more weeks. Ok to increase movement and lifting up to 10lbs after this until 6 weeks post op, when restrictions will likely be cleared.  -RTC 6 weeks with Dr. Jennifer Bronson PA-C  Plastic and Reconstructive Surgery    20 minutes spent on the date of the encounter doing chart review, history and physical, dressing changes, documentation and further activity as noted above.    "

## 2021-02-26 ENCOUNTER — OFFICE VISIT (OUTPATIENT)
Dept: PLASTIC SURGERY | Facility: CLINIC | Age: 26
End: 2021-02-26
Payer: COMMERCIAL

## 2021-02-26 VITALS
TEMPERATURE: 98.1 F | OXYGEN SATURATION: 100 % | BODY MASS INDEX: 35.89 KG/M2 | WEIGHT: 215.4 LBS | HEART RATE: 64 BPM | HEIGHT: 65 IN | SYSTOLIC BLOOD PRESSURE: 123 MMHG | DIASTOLIC BLOOD PRESSURE: 67 MMHG

## 2021-02-26 DIAGNOSIS — F64.0 GENDER DYSPHORIA IN ADOLESCENT AND ADULT: Primary | ICD-10-CM

## 2021-02-26 PROCEDURE — 99024 POSTOP FOLLOW-UP VISIT: CPT | Performed by: PHYSICIAN ASSISTANT

## 2021-02-26 ASSESSMENT — PAIN SCALES - GENERAL: PAINLEVEL: MILD PAIN (2)

## 2021-02-26 ASSESSMENT — MIFFLIN-ST. JEOR: SCORE: 1722.93

## 2021-02-26 NOTE — PATIENT INSTRUCTIONS
Care of Nipples and Chest Incisions    Change nipple dressings daily.  Take dressings off before or after showering.  No direct shower spray on nipple grafts for 4 weeks from your surgery date.     Cut vaseline gauze to nipple size and place over nipple.  Place folded white gauze over vaseline gauze and cover with plastic dressing.  Do dressing changes for 1 more week from today.  If you continue to have drainage, continue the dressings until drainage stops.     Keep compression on for 1 more week from today. No lifting greater than 5 lbs for 4 weeks from your surgery date. Begin moisturizing your incisions with any non-scented, non-glittered lotion 3 weeks from your surgery date. Once this becomes loose, you may peel off the incisional tape. Then apply silicone gel sheets to incisions.  These can be purchased on [employer] and at [employer] and [employer].     At one month post op, baseline shoulder motion should return. You may start to exercise lightly at this time, gradually moving up to arm movement. Full shoulder motion should return by 8 weeks.      When to call:  Sudden increase of swelling or pain on one side  Uncontrolled pain despite pain medication  Worsening of chest swelling   Separation of nipple from chest skin  Redness and warmth in chest area  Fever > 101     Contact the RN between 8-3:30 Mon-Fri with questions or concerns through my chart message via your doctor's name (most efficient) or call at [phone].   For urgent medical issues that cannot wait, call [phone] Mon-Fri 8:-4:30.     After hours, weekends or holidays, call [phone] and ask to speak to the on call plastic surgeon fellow.

## 2021-02-26 NOTE — NURSING NOTE
"Chief Complaint   Patient presents with     Surgical Followup     1 week post op.        Vitals:    02/26/21 0807   BP: 123/67   BP Location: Left arm   Patient Position: Sitting   Cuff Size: Adult Large   Pulse: 64   Temp: 98.1  F (36.7  C)   TempSrc: Oral   SpO2: 100%   Weight: 97.7 kg (215 lb 6.4 oz)   Height: 1.651 m (5' 5\")       Body mass index is 35.84 kg/m .    Tory Cole LPN      "

## 2021-02-26 NOTE — LETTER
"2/26/2021       RE: Roland Frances  [address]  [facility] [address]     Dear Colleague,    Thank you for referring your patient, Roland Frances, to the [facility] PLASTIC AND RECONSTRUCTIVE SURGERY CLINIC [place] at [facility]. Please see a copy of my visit note below.    Plastic Surgery Outpatient Visit    ID: Roland Frances is a 26 year old transgender male / female-to-male s/p bilateral mastectomy with nipple grafts 2/17 with Dr. Rodriguez    S: Doing well, no longer taking pain meds. Drains meeting criteria for removal.     O:  /67 (BP Location: Left arm, Patient Position: Sitting, Cuff Size: Adult Large)   Pulse 64   Temp 98.1  F (36.7  C) (Oral)   Ht 1.651 m (5' 5\")   Wt 97.7 kg (215 lb 6.4 oz)   SpO2 100%   BMI 35.84 kg/m     General: NAD  Chest: bilateral chest incisions c/d/i with tape intact. Nipples well adhered bilaterally. Mild residual ecchymosis medially. Overall good contour and shape.     PATH: FINAL DIAGNOSIS:   A. Left breast, simple mastectomy with nipple graft (958 g):   - Benign breast tissue   - No atypical or malignant findings     B. Right breast, simple mastectomy with nipple graft (796 g):   - Benign breast tissue   - No atypical or malignant findings     A/P:  -healing well  -drains removed today  -nipple dressings x1 week  -wrap x1 week  - tape off in 2 weeks.  -Trex/5lb lifting restrictions x2 more weeks. Ok to increase movement and lifting up to 10lbs after this until 6 weeks post op, when restrictions will likely be cleared.  -RTC 6 weeks with Dr. Jennifer Bronson PA-C  Plastic and Reconstructive Surgery    20 minutes spent on the date of the encounter doing chart review, history and physical, dressing changes, documentation and further activity as noted above.        "

## 2021-04-25 ENCOUNTER — HEALTH MAINTENANCE LETTER (OUTPATIENT)
Age: 26
End: 2021-04-25

## 2021-05-07 ENCOUNTER — OFFICE VISIT (OUTPATIENT)
Dept: PLASTIC SURGERY | Facility: CLINIC | Age: 26
End: 2021-05-07
Payer: COMMERCIAL

## 2021-05-07 VITALS
DIASTOLIC BLOOD PRESSURE: 63 MMHG | TEMPERATURE: 98.2 F | HEIGHT: 65 IN | SYSTOLIC BLOOD PRESSURE: 110 MMHG | WEIGHT: 212.3 LBS | HEART RATE: 64 BPM | OXYGEN SATURATION: 98 % | BODY MASS INDEX: 35.37 KG/M2

## 2021-05-07 DIAGNOSIS — Z90.13 S/P BILATERAL MASTECTOMY: Primary | ICD-10-CM

## 2021-05-07 PROCEDURE — 99024 POSTOP FOLLOW-UP VISIT: CPT | Performed by: SURGERY

## 2021-05-07 ASSESSMENT — PAIN SCALES - GENERAL: PAINLEVEL: NO PAIN (0)

## 2021-05-07 ASSESSMENT — MIFFLIN-ST. JEOR: SCORE: 1708.87

## 2021-05-07 NOTE — PROGRESS NOTES
"PLASTICS POST-OP  This is a 26 year old trans male with a history of gender dysphoria who presents 3 months post-op after a bilateral simple mastectomy with nipple graft reconstruction on 2/17/21.     Dominic states they have full range of motion. Occasional scar tenderness in the AM. Using a salve with 5 min. Massage 2x Qday for scar treatment (beeswax, calendula, by [employer] TS Scar Salve). Originally used silicone scar tape but stopped because it was expensive and didn't adhere. States sensation has mostly returned. Patient is happy with their outcome. States that the results have \"exceeded their expectations.\" Now feels comfortable walking around shirtless at home. States \"quality of life is great.\" Patient has been running and using weights. States that it has been easier to exercise due to the resolution of their dysphoria.     PE: Chest:  Nice upper chest contour.   Good symmetry. Left incision is slightly more superior than the right.   Mild bilateral posterior fullness. IMF scars just below inferior pec origin on flexion.   Mild scar hypertrophy, less so laterally. Patient states that scars are not getting thicker.   Incisions do not touch at the midline. Slight medial fullness bilaterally along scars.   Nipple grafts have scattered pigmentation. Left nipple graft has more pigment than the right.   Overall great results.   Photos taken with verbal consent.     A&P: 26 year old trans male who presents 3 months post-op after a bilateral simple mastectomy with nipple graft conservation on 2/17/21.     Overall Dominic is healing well. He can gradually increase activity as tolerated. Patient plans to monitor their scar's hypertrophy.    Dominic plans to continue using their scar salve and massaging scars.    He will follow up 6-12 months post-op. Causes for returning sooner were discussed.     Total time = 10 minutes, spent on the date of encounter doing chart review, history and physical, dressing changes, " documentation, patient education, and any further activity as noted above.     This note was prepared on behalf of Nehal Rodriguez MD by More Pagan, a trained medical scribe, based on my observations and the provider's statements to me.

## 2021-05-07 NOTE — NURSING NOTE
"Chief Complaint   Patient presents with     Post-op Visit     Patient here for post-op       Vitals:    05/07/21 0853   BP: 110/63   BP Location: Left arm   Patient Position: Sitting   Cuff Size: Adult Large   Pulse: 64   Temp: 98.2  F (36.8  C)   TempSrc: Oral   SpO2: 98%   Weight: 212 lb 4.8 oz   Height: 5' 5\"       Body mass index is 35.33 kg/m .    Rosemarie Mcleod MA    "

## 2021-05-07 NOTE — LETTER
"5/7/2021       RE: Roland Frances  [address]  [facility] [address]     Dear Colleague,    Thank you for referring your patient, Roland Frances, to the [facility] PLASTIC AND RECONSTRUCTIVE SURGERY CLINIC [place] at [facility]. Please see a copy of my visit note below.    PLASTICS POST-OP  This is a 26 year old trans male with a history of gender dysphoria who presents 3 months post-op after a bilateral simple mastectomy with nipple graft reconstruction on 2/17/21.     Delgado-Saige states they have full range of motion. Occasional scar tenderness in the AM. Using a salve with 5 min. Massage 2x Qday for scar treatment (beeswax, calendula, by [employer] [place] TS Scar Salve). Originally used silicone scar tape but stopped because it was expensive and didn't adhere. States sensation has mostly returned. Patient is happy with their outcome. States that the results have \"exceeded their expectations.\" Now feels comfortable walking around shirtless at home. States \"quality of life is great.\" Patient has been running and using weights. States that it has been easier to exercise due to the resolution of their dysphoria.     PE: Chest:  Nice upper chest contour.   Good symmetry. Left incision is slightly more superior than the right.   Mild bilateral posterior fullness. IMF scars just below inferior pec origin on flexion.   Mild scar hypertrophy, less so laterally. Patient states that scars are not getting thicker.   Incisions do not touch at the midline. Slight medial fullness bilaterally along scars.   Nipple grafts have scattered pigmentation. Left nipple graft has more pigment than the right.   Overall great results.   Photos taken with verbal consent.     A&P: 26 year old trans male who presents 3 months post-op after a bilateral simple mastectomy with nipple graft conservation on 2/17/21.     Overall Dominic is healing well. He can gradually increase activity as " tolerated. Patient plans to monitor their scar's hypertrophy.    Dominic plans to continue using their scar salve and massaging scars.    He will follow up 6-12 months post-op. Causes for returning sooner were discussed.     Total time = 10 minutes, spent on the date of encounter doing chart review, history and physical, dressing changes, documentation, patient education, and any further activity as noted above.     This note was prepared on behalf of Nehal Rodriguez MD by More Pagan, a trained medical scribe, based on my observations and the provider's statements to me.     Again, thank you for allowing me to participate in the care of your patient.      Sincerely,    Nehal Rodriguez MD

## 2021-10-10 ENCOUNTER — HEALTH MAINTENANCE LETTER (OUTPATIENT)
Age: 26
End: 2021-10-10

## 2022-05-21 ENCOUNTER — HEALTH MAINTENANCE LETTER (OUTPATIENT)
Age: 27
End: 2022-05-21

## 2022-09-18 ENCOUNTER — HEALTH MAINTENANCE LETTER (OUTPATIENT)
Age: 27
End: 2022-09-18

## 2022-10-04 PROBLEM — F64.0 TRANSSEXUALISM: Status: ACTIVE | Noted: 2020-12-01

## 2023-06-04 ENCOUNTER — HEALTH MAINTENANCE LETTER (OUTPATIENT)
Age: 28
End: 2023-06-04

## (undated) DEVICE — ESU GROUND PAD UNIVERSAL W/O CORD

## (undated) DEVICE — COVER CAMERA IN-LIGHT DISP LT-C02

## (undated) DEVICE — LINEN ORTHO PACK 5446

## (undated) DEVICE — DRAPE LAP TRANSVERSE 29421

## (undated) DEVICE — STPL SKIN 35W ROTATING HEAD PRW35

## (undated) DEVICE — LABEL MEDICATION SYSTEM 3303-P

## (undated) DEVICE — SU VICRYL 4-0 PS-1 18" UND J682G

## (undated) DEVICE — GOWN XLG DISP 9545

## (undated) DEVICE — POSITIONER ARMBOARD FOAM 1PAIR LF FP-ARMB1

## (undated) DEVICE — ESU PENCIL W/SMOKE EVAC NEPTUNE STRYKER 0703-046-000

## (undated) DEVICE — SUCTION MANIFOLD NEPTUNE 2 SYS 4 PORT 0702-020-000

## (undated) DEVICE — DRSG XEROFORM 5X9" 8884431605

## (undated) DEVICE — DECANTER TRANSFER DEVICE 2008S

## (undated) DEVICE — EYE MARKING PAD 581057

## (undated) DEVICE — SYR BULB IRRIG 50ML LATEX FREE 0035280

## (undated) DEVICE — STRAP KNEE/BODY 31143004

## (undated) DEVICE — LIGHT HANDLE X2

## (undated) DEVICE — PAD CHUX UNDERPAD 30X36" P3036C

## (undated) DEVICE — SOL NACL 0.9% IRRIG 1000ML BOTTLE 2F7124

## (undated) DEVICE — DRAIN JACKSON PRATT RESERVOIR 100ML SU130-1305

## (undated) DEVICE — CLOSURE SYS SKIN PREMIERPRO EXOFINFUSION 4X60CM 3473

## (undated) DEVICE — SU VICRYL 3-0 FS-1 27" J442H

## (undated) DEVICE — SOL ADH LIQUID BENZOIN SWAB 0.6ML C1544

## (undated) DEVICE — SOL WATER IRRIG 1000ML BOTTLE 2F7114

## (undated) DEVICE — BNDG ELASTIC 6" DBL LENGTH UNSTERILE 6611-16

## (undated) DEVICE — BLADE KNIFE SURG 10 371110

## (undated) DEVICE — SPECIMEN CONTAINER 5OZ STERILE 2600SA

## (undated) DEVICE — POSITIONER HEAD DONUT FOAM 9" LF FP-HEAD9

## (undated) DEVICE — ESU BLADE PEAK PLASMA 3.0S PS210-030S

## (undated) DEVICE — SU ETHILON 3-0 FS-1 18" 663G

## (undated) DEVICE — TUBING SUCTION MEDI-VAC 1/4"X20' N620A

## (undated) DEVICE — DRSG KERLIX 4 1/2"X4YDS ROLL 6730

## (undated) DEVICE — GLOVE PROTEXIS MICRO 6.5  2D73PM65

## (undated) DEVICE — WIPES FOLEY CARE SURESTEP PROVON DFC100

## (undated) DEVICE — SU VICRYL 2-0 CT-1 27" UND J259H

## (undated) DEVICE — DRSG TEGADERM 2 3/8X2 3/4" 1624W

## (undated) DEVICE — BNDG ELASTIC 6"X5YDS UNSTERILE 6611-60

## (undated) DEVICE — DRSG ABDOMINAL 07 1/2X8" 7197D

## (undated) DEVICE — PREP CHLORAPREP 26ML TINTED HI-LITE ORANGE 930815

## (undated) DEVICE — SU SILK 2-0 TIE 24" SA75H

## (undated) DEVICE — PEN MARKING SKIN W/PAPER RULER 31145785

## (undated) DEVICE — SU PLAIN FAST ABSORB 5-0 PC-1 18" 1915G

## (undated) DEVICE — Device

## (undated) DEVICE — CATH TRAY FOLEY SURESTEP 16FR WDRAIN BAG STLK LATEX A300316A

## (undated) DEVICE — BLADE KNIFE SURG 15 371115

## (undated) DEVICE — SPONGE LAP 18X18" X8435

## (undated) DEVICE — DRAIN JACKSON PRATT 15FR ROUND SU130-1323

## (undated) DEVICE — LINEN TOWEL PACK X5 5464

## (undated) RX ORDER — CEFAZOLIN SODIUM 2 G/100ML
INJECTION, SOLUTION INTRAVENOUS
Status: DISPENSED
Start: 2021-02-17

## (undated) RX ORDER — HYDROMORPHONE HYDROCHLORIDE 1 MG/ML
INJECTION, SOLUTION INTRAMUSCULAR; INTRAVENOUS; SUBCUTANEOUS
Status: DISPENSED
Start: 2021-02-17

## (undated) RX ORDER — FENTANYL CITRATE 50 UG/ML
INJECTION, SOLUTION INTRAMUSCULAR; INTRAVENOUS
Status: DISPENSED
Start: 2021-02-17

## (undated) RX ORDER — KETOROLAC TROMETHAMINE 30 MG/ML
INJECTION, SOLUTION INTRAMUSCULAR; INTRAVENOUS
Status: DISPENSED
Start: 2021-02-17

## (undated) RX ORDER — ACETAMINOPHEN 325 MG/1
TABLET ORAL
Status: DISPENSED
Start: 2021-02-17

## (undated) RX ORDER — PROPOFOL 10 MG/ML
INJECTION, EMULSION INTRAVENOUS
Status: DISPENSED
Start: 2021-02-17

## (undated) RX ORDER — BUPIVACAINE HYDROCHLORIDE 2.5 MG/ML
INJECTION, SOLUTION INFILTRATION; PERINEURAL
Status: DISPENSED
Start: 2021-02-17

## (undated) RX ORDER — CEFAZOLIN SODIUM 1 G/3ML
INJECTION, POWDER, FOR SOLUTION INTRAMUSCULAR; INTRAVENOUS
Status: DISPENSED
Start: 2021-02-17